# Patient Record
Sex: FEMALE | Race: WHITE | NOT HISPANIC OR LATINO | URBAN - METROPOLITAN AREA
[De-identification: names, ages, dates, MRNs, and addresses within clinical notes are randomized per-mention and may not be internally consistent; named-entity substitution may affect disease eponyms.]

---

## 2017-01-06 ENCOUNTER — EMERGENCY (EMERGENCY)
Facility: HOSPITAL | Age: 77
LOS: 1 days | Discharge: HOME | End: 2017-01-06
Attending: EMERGENCY MEDICINE
Payer: MEDICARE

## 2017-01-06 VITALS
RESPIRATION RATE: 20 BRPM | HEART RATE: 66 BPM | WEIGHT: 279.99 LBS | OXYGEN SATURATION: 100 % | HEIGHT: 69 IN | TEMPERATURE: 98 F | SYSTOLIC BLOOD PRESSURE: 159 MMHG

## 2017-01-06 DIAGNOSIS — Z90.89 ACQUIRED ABSENCE OF OTHER ORGANS: ICD-10-CM

## 2017-01-06 DIAGNOSIS — Z98.89 OTHER SPECIFIED POSTPROCEDURAL STATES: Chronic | ICD-10-CM

## 2017-01-06 DIAGNOSIS — R06.02 SHORTNESS OF BREATH: ICD-10-CM

## 2017-01-06 DIAGNOSIS — Z90.710 ACQUIRED ABSENCE OF BOTH CERVIX AND UTERUS: Chronic | ICD-10-CM

## 2017-01-06 DIAGNOSIS — Z98.890 OTHER SPECIFIED POSTPROCEDURAL STATES: ICD-10-CM

## 2017-01-06 DIAGNOSIS — I10 ESSENTIAL (PRIMARY) HYPERTENSION: ICD-10-CM

## 2017-01-06 DIAGNOSIS — Z88.1 ALLERGY STATUS TO OTHER ANTIBIOTIC AGENTS STATUS: ICD-10-CM

## 2017-01-06 DIAGNOSIS — R53.1 WEAKNESS: ICD-10-CM

## 2017-01-06 DIAGNOSIS — Z88.0 ALLERGY STATUS TO PENICILLIN: ICD-10-CM

## 2017-01-06 DIAGNOSIS — Z88.2 ALLERGY STATUS TO SULFONAMIDES: ICD-10-CM

## 2017-01-06 DIAGNOSIS — Z98.1 ARTHRODESIS STATUS: Chronic | ICD-10-CM

## 2017-01-06 DIAGNOSIS — E11.9 TYPE 2 DIABETES MELLITUS WITHOUT COMPLICATIONS: ICD-10-CM

## 2017-01-06 DIAGNOSIS — Z90.710 ACQUIRED ABSENCE OF BOTH CERVIX AND UTERUS: ICD-10-CM

## 2017-01-06 DIAGNOSIS — K21.9 GASTRO-ESOPHAGEAL REFLUX DISEASE WITHOUT ESOPHAGITIS: ICD-10-CM

## 2017-01-06 DIAGNOSIS — Z90.49 ACQUIRED ABSENCE OF OTHER SPECIFIED PARTS OF DIGESTIVE TRACT: ICD-10-CM

## 2017-01-06 LAB
ANION GAP SERPL CALC-SCNC: 16 MMOL/L — SIGNIFICANT CHANGE UP (ref 5–17)
ANISOCYTOSIS BLD QL: SLIGHT — SIGNIFICANT CHANGE UP
APTT BLD: 38.6 SEC — HIGH (ref 27.5–37.4)
BASOPHILS # BLD AUTO: 0 K/UL — SIGNIFICANT CHANGE UP (ref 0–0.2)
BASOPHILS NFR BLD AUTO: 1 % — SIGNIFICANT CHANGE UP (ref 0–2)
BUN SERPL-MCNC: 18 MG/DL — SIGNIFICANT CHANGE UP (ref 8–20)
CALCIUM SERPL-MCNC: 8.8 MG/DL — SIGNIFICANT CHANGE UP (ref 8.6–10.2)
CHLORIDE SERPL-SCNC: 100 MMOL/L — SIGNIFICANT CHANGE UP (ref 98–107)
CO2 SERPL-SCNC: 24 MMOL/L — SIGNIFICANT CHANGE UP (ref 22–29)
CREAT SERPL-MCNC: 1.15 MG/DL — SIGNIFICANT CHANGE UP (ref 0.5–1.3)
DACRYOCYTES BLD QL SMEAR: SLIGHT — SIGNIFICANT CHANGE UP
ELLIPTOCYTES BLD QL SMEAR: SLIGHT — SIGNIFICANT CHANGE UP
EOSINOPHIL # BLD AUTO: 0.1 K/UL — SIGNIFICANT CHANGE UP (ref 0–0.5)
EOSINOPHIL NFR BLD AUTO: 3 % — SIGNIFICANT CHANGE UP (ref 0–5)
GLUCOSE SERPL-MCNC: 110 MG/DL — SIGNIFICANT CHANGE UP (ref 70–115)
HCT VFR BLD CALC: 35 % — LOW (ref 37–47)
HGB BLD-MCNC: 11.2 G/DL — LOW (ref 12–16)
INR BLD: 1.73 RATIO — HIGH (ref 0.88–1.16)
LYMPHOCYTES # BLD AUTO: 1 K/UL — SIGNIFICANT CHANGE UP (ref 1–4.8)
LYMPHOCYTES # BLD AUTO: 24 % — SIGNIFICANT CHANGE UP (ref 20–55)
MACROCYTES BLD QL: SLIGHT — SIGNIFICANT CHANGE UP
MAGNESIUM SERPL-MCNC: 2.2 MG/DL — SIGNIFICANT CHANGE UP (ref 1.8–2.5)
MCHC RBC-ENTMCNC: 26.7 PG — LOW (ref 27–31)
MCHC RBC-ENTMCNC: 32 G/DL — SIGNIFICANT CHANGE UP (ref 32–36)
MCV RBC AUTO: 83.3 FL — SIGNIFICANT CHANGE UP (ref 81–99)
MICROCYTES BLD QL: SLIGHT — SIGNIFICANT CHANGE UP
MONOCYTES # BLD AUTO: 0.4 K/UL — SIGNIFICANT CHANGE UP (ref 0–0.8)
MONOCYTES NFR BLD AUTO: 10 % — SIGNIFICANT CHANGE UP (ref 3–10)
NEUTROPHILS # BLD AUTO: 2.7 K/UL — SIGNIFICANT CHANGE UP (ref 1.8–8)
NEUTROPHILS NFR BLD AUTO: 58 % — SIGNIFICANT CHANGE UP (ref 37–73)
NEUTS BAND # BLD: 1 % — SIGNIFICANT CHANGE UP (ref 0–8)
OVALOCYTES BLD QL SMEAR: SLIGHT — SIGNIFICANT CHANGE UP
PHOSPHATE SERPL-MCNC: 2.9 MG/DL — SIGNIFICANT CHANGE UP (ref 2.4–4.7)
PLAT MORPH BLD: NORMAL — SIGNIFICANT CHANGE UP
PLATELET # BLD AUTO: 230 K/UL — SIGNIFICANT CHANGE UP (ref 150–400)
POIKILOCYTOSIS BLD QL AUTO: SLIGHT — SIGNIFICANT CHANGE UP
POTASSIUM SERPL-MCNC: 3.8 MMOL/L — SIGNIFICANT CHANGE UP (ref 3.5–5.3)
POTASSIUM SERPL-SCNC: 3.8 MMOL/L — SIGNIFICANT CHANGE UP (ref 3.5–5.3)
PROTHROM AB SERPL-ACNC: 19.1 SEC — HIGH (ref 10–13.1)
RBC # BLD: 4.2 M/UL — LOW (ref 4.4–5.2)
RBC BLD AUTO: ABNORMAL
SCHISTOCYTES BLD QL AUTO: SLIGHT — SIGNIFICANT CHANGE UP
SODIUM SERPL-SCNC: 140 MMOL/L — SIGNIFICANT CHANGE UP (ref 135–145)
TROPONIN T SERPL-MCNC: <0.01 NG/ML — SIGNIFICANT CHANGE UP (ref 0–0.06)
VARIANT LYMPHS # BLD: 3 % — SIGNIFICANT CHANGE UP (ref 0–6)
WBC # BLD: 4.27 K/UL — LOW (ref 4.8–10.8)
WBC # FLD AUTO: 4.27 K/UL — LOW (ref 4.8–10.8)

## 2017-01-06 PROCEDURE — 80048 BASIC METABOLIC PNL TOTAL CA: CPT

## 2017-01-06 PROCEDURE — 99283 EMERGENCY DEPT VISIT LOW MDM: CPT | Mod: 25

## 2017-01-06 PROCEDURE — 99285 EMERGENCY DEPT VISIT HI MDM: CPT

## 2017-01-06 PROCEDURE — 71045 X-RAY EXAM CHEST 1 VIEW: CPT

## 2017-01-06 PROCEDURE — 85610 PROTHROMBIN TIME: CPT

## 2017-01-06 PROCEDURE — 84100 ASSAY OF PHOSPHORUS: CPT

## 2017-01-06 PROCEDURE — 93005 ELECTROCARDIOGRAM TRACING: CPT

## 2017-01-06 PROCEDURE — 83735 ASSAY OF MAGNESIUM: CPT

## 2017-01-06 PROCEDURE — 85027 COMPLETE CBC AUTOMATED: CPT

## 2017-01-06 PROCEDURE — 85730 THROMBOPLASTIN TIME PARTIAL: CPT

## 2017-01-06 PROCEDURE — 93010 ELECTROCARDIOGRAM REPORT: CPT

## 2017-01-06 PROCEDURE — 71010: CPT | Mod: 26

## 2017-01-06 PROCEDURE — 84484 ASSAY OF TROPONIN QUANT: CPT

## 2017-01-06 NOTE — ED ADULT NURSE NOTE - PSH
S/P ankle arthrodesis    S/P appendectomy    S/P hemorrhoidectomy    S/P hysterectomy    S/P tonsillectomy

## 2017-01-06 NOTE — ED ADULT NURSE NOTE - PMH
Angina pectoris    Arthritis    Atrial fibrillation    Diabetes    DVT (deep venous thrombosis)    Fibromyalgia    GERD (gastroesophageal reflux disease)    HTN (hypertension)    Sleep apnea

## 2017-01-06 NOTE — ED ADULT NURSE NOTE - OBJECTIVE STATEMENT
75 y/o female c/o of an episode of dizziness and flush feeling today when she stood up. Pt AOx3, 97% O2Sat on room air, denies chest pain or SOB at this time. HR41. will continue to monitor.

## 2017-01-06 NOTE — ED ADULT NURSE REASSESSMENT NOTE - NS ED NURSE REASSESS COMMENT FT1
Pt family concerned with Pt neuro due to episode of unsteady gait at home. Pt AOx3, equal strength bilateral, speech normal, will continue to monitor.

## 2017-01-08 NOTE — ED PROVIDER NOTE - MEDICAL DECISION MAKING DETAILS
pt feeling much better in nad return to ed for intractrable HA persitent vomiting or new onset motor or senory defuicits  DDX considered: stable angina, aortic dissection, pericarditis, pneumonia, pe, pts, chest wall pain, esophageal spasm and abdominal emergencies. I have discussed with patient with negative troponin and normal ekg their 28 day cardiac risk and they have agreed to outpt cardio f/ut his week without fail. asa per day until then. return to the ed immediately for any intractable chest pain or sob. pt agrees to plan of care

## 2017-01-08 NOTE — ED PROVIDER NOTE - OBJECTIVE STATEMENT
pt presents with genralized weakness. no new medications. denies fever. denies HA or neck pain. no chest pain or sob. no abd pain. no n/v/d. no urinary f/u/d. no back pain. no motor or sensory deficits. denies drug use. no recent travel. no rash. no other acute issues symptoms or concerns

## 2017-01-08 NOTE — ED PROVIDER NOTE - PHYSICAL EXAMINATION
neuro: CN II - XII intact, EOMI, PERRL, no papilledema, 5/5 muscle strength x 4 extremities, no sensory deficits, 2+ dtr globally, negative babinski, no ataxic gait, normal DWIGHT and FNT, normal romberg

## 2017-02-07 ENCOUNTER — INPATIENT (INPATIENT)
Facility: HOSPITAL | Age: 77
LOS: 3 days | Discharge: NURSING, REHAB OR LONG-TERM CARE | DRG: 312 | End: 2017-02-11
Attending: FAMILY MEDICINE | Admitting: HOSPITALIST
Payer: MEDICARE

## 2017-02-07 VITALS
WEIGHT: 261.03 LBS | DIASTOLIC BLOOD PRESSURE: 104 MMHG | SYSTOLIC BLOOD PRESSURE: 191 MMHG | OXYGEN SATURATION: 100 % | RESPIRATION RATE: 22 BRPM | HEIGHT: 68 IN | TEMPERATURE: 98 F | HEART RATE: 84 BPM

## 2017-02-07 DIAGNOSIS — R42 DIZZINESS AND GIDDINESS: ICD-10-CM

## 2017-02-07 DIAGNOSIS — D50.0 IRON DEFICIENCY ANEMIA SECONDARY TO BLOOD LOSS (CHRONIC): ICD-10-CM

## 2017-02-07 DIAGNOSIS — Z98.89 OTHER SPECIFIED POSTPROCEDURAL STATES: Chronic | ICD-10-CM

## 2017-02-07 DIAGNOSIS — Z90.710 ACQUIRED ABSENCE OF BOTH CERVIX AND UTERUS: Chronic | ICD-10-CM

## 2017-02-07 DIAGNOSIS — I82.409 ACUTE EMBOLISM AND THROMBOSIS OF UNSPECIFIED DEEP VEINS OF UNSPECIFIED LOWER EXTREMITY: ICD-10-CM

## 2017-02-07 DIAGNOSIS — I48.2 CHRONIC ATRIAL FIBRILLATION: ICD-10-CM

## 2017-02-07 DIAGNOSIS — Z98.1 ARTHRODESIS STATUS: Chronic | ICD-10-CM

## 2017-02-07 DIAGNOSIS — I10 ESSENTIAL (PRIMARY) HYPERTENSION: ICD-10-CM

## 2017-02-07 DIAGNOSIS — R26.81 UNSTEADINESS ON FEET: ICD-10-CM

## 2017-02-07 LAB
ALBUMIN SERPL ELPH-MCNC: 4.1 G/DL — SIGNIFICANT CHANGE UP (ref 3.3–5.2)
ALP SERPL-CCNC: 128 U/L — HIGH (ref 40–120)
ALT FLD-CCNC: 11 U/L — SIGNIFICANT CHANGE UP
ANION GAP SERPL CALC-SCNC: 14 MMOL/L — SIGNIFICANT CHANGE UP (ref 5–17)
ANISOCYTOSIS BLD QL: SLIGHT — SIGNIFICANT CHANGE UP
AST SERPL-CCNC: 16 U/L — SIGNIFICANT CHANGE UP
BASOPHILS # BLD AUTO: 0 K/UL — SIGNIFICANT CHANGE UP (ref 0–0.2)
BILIRUB SERPL-MCNC: 0.3 MG/DL — LOW (ref 0.4–2)
BUN SERPL-MCNC: 13 MG/DL — SIGNIFICANT CHANGE UP (ref 8–20)
CALCIUM SERPL-MCNC: 9.4 MG/DL — SIGNIFICANT CHANGE UP (ref 8.6–10.2)
CHLORIDE SERPL-SCNC: 104 MMOL/L — SIGNIFICANT CHANGE UP (ref 98–107)
CK SERPL-CCNC: 53 U/L — SIGNIFICANT CHANGE UP (ref 25–170)
CO2 SERPL-SCNC: 25 MMOL/L — SIGNIFICANT CHANGE UP (ref 22–29)
CREAT SERPL-MCNC: 0.97 MG/DL — SIGNIFICANT CHANGE UP (ref 0.5–1.3)
D DIMER BLD IA.RAPID-MCNC: 155 NG/ML DDU — SIGNIFICANT CHANGE UP
EOSINOPHIL # BLD AUTO: 0.1 K/UL — SIGNIFICANT CHANGE UP (ref 0–0.5)
EOSINOPHIL NFR BLD AUTO: 2 % — SIGNIFICANT CHANGE UP (ref 0–5)
GLUCOSE SERPL-MCNC: 118 MG/DL — HIGH (ref 70–115)
HCT VFR BLD CALC: 38.2 % — SIGNIFICANT CHANGE UP (ref 37–47)
HGB BLD-MCNC: 12 G/DL — SIGNIFICANT CHANGE UP (ref 12–16)
INR BLD: 1.29 RATIO — HIGH (ref 0.88–1.16)
LYMPHOCYTES # BLD AUTO: 1.2 K/UL — SIGNIFICANT CHANGE UP (ref 1–4.8)
LYMPHOCYTES # BLD AUTO: 28 % — SIGNIFICANT CHANGE UP (ref 20–55)
MCHC RBC-ENTMCNC: 27.3 PG — SIGNIFICANT CHANGE UP (ref 27–31)
MCHC RBC-ENTMCNC: 31.4 G/DL — LOW (ref 32–36)
MCV RBC AUTO: 86.8 FL — SIGNIFICANT CHANGE UP (ref 81–99)
MONOCYTES # BLD AUTO: 0.4 K/UL — SIGNIFICANT CHANGE UP (ref 0–0.8)
MONOCYTES NFR BLD AUTO: 11 % — HIGH (ref 3–10)
NEUTROPHILS # BLD AUTO: 2 K/UL — SIGNIFICANT CHANGE UP (ref 1.8–8)
NEUTROPHILS NFR BLD AUTO: 57 % — SIGNIFICANT CHANGE UP (ref 37–73)
NEUTS BAND # BLD: 2 % — SIGNIFICANT CHANGE UP (ref 0–8)
OVALOCYTES BLD QL SMEAR: SLIGHT — SIGNIFICANT CHANGE UP
PLAT MORPH BLD: NORMAL — SIGNIFICANT CHANGE UP
PLATELET # BLD AUTO: 201 K/UL — SIGNIFICANT CHANGE UP (ref 150–400)
POIKILOCYTOSIS BLD QL AUTO: SLIGHT — SIGNIFICANT CHANGE UP
POTASSIUM SERPL-MCNC: 3.6 MMOL/L — SIGNIFICANT CHANGE UP (ref 3.5–5.3)
POTASSIUM SERPL-SCNC: 3.6 MMOL/L — SIGNIFICANT CHANGE UP (ref 3.5–5.3)
PROT SERPL-MCNC: 7.9 G/DL — SIGNIFICANT CHANGE UP (ref 6.6–8.7)
PROTHROM AB SERPL-ACNC: 14.2 SEC — HIGH (ref 10–13.1)
RBC # BLD: 4.4 M/UL — SIGNIFICANT CHANGE UP (ref 4.4–5.2)
RBC # FLD: 22.3 % — HIGH (ref 11–15.6)
RBC BLD AUTO: ABNORMAL
SODIUM SERPL-SCNC: 143 MMOL/L — SIGNIFICANT CHANGE UP (ref 135–145)
TROPONIN T SERPL-MCNC: <0.01 NG/ML — SIGNIFICANT CHANGE UP (ref 0–0.06)
WBC # BLD: 3.6 K/UL — LOW (ref 4.8–10.8)
WBC # FLD AUTO: 3.6 K/UL — LOW (ref 4.8–10.8)

## 2017-02-07 PROCEDURE — 99285 EMERGENCY DEPT VISIT HI MDM: CPT

## 2017-02-07 PROCEDURE — 93010 ELECTROCARDIOGRAM REPORT: CPT

## 2017-02-07 PROCEDURE — 93970 EXTREMITY STUDY: CPT | Mod: 26

## 2017-02-07 PROCEDURE — 99222 1ST HOSP IP/OBS MODERATE 55: CPT

## 2017-02-07 PROCEDURE — 70450 CT HEAD/BRAIN W/O DYE: CPT | Mod: 26

## 2017-02-07 RX ORDER — HYDRALAZINE HCL 50 MG
5 TABLET ORAL ONCE
Qty: 0 | Refills: 0 | Status: COMPLETED | OUTPATIENT
Start: 2017-02-07 | End: 2017-02-07

## 2017-02-07 RX ORDER — ENOXAPARIN SODIUM 100 MG/ML
120 INJECTION SUBCUTANEOUS ONCE
Qty: 0 | Refills: 0 | Status: COMPLETED | OUTPATIENT
Start: 2017-02-07 | End: 2017-02-07

## 2017-02-07 RX ORDER — ACETAMINOPHEN 500 MG
650 TABLET ORAL ONCE
Qty: 0 | Refills: 0 | Status: COMPLETED | OUTPATIENT
Start: 2017-02-07 | End: 2017-02-07

## 2017-02-07 RX ORDER — INSULIN LISPRO 100/ML
VIAL (ML) SUBCUTANEOUS
Qty: 0 | Refills: 0 | Status: DISCONTINUED | OUTPATIENT
Start: 2017-02-07 | End: 2017-02-11

## 2017-02-07 RX ORDER — ATORVASTATIN CALCIUM 80 MG/1
40 TABLET, FILM COATED ORAL AT BEDTIME
Qty: 0 | Refills: 0 | Status: DISCONTINUED | OUTPATIENT
Start: 2017-02-07 | End: 2017-02-11

## 2017-02-07 RX ORDER — SODIUM CHLORIDE 9 MG/ML
3 INJECTION INTRAMUSCULAR; INTRAVENOUS; SUBCUTANEOUS ONCE
Qty: 0 | Refills: 0 | Status: COMPLETED | OUTPATIENT
Start: 2017-02-07 | End: 2017-02-07

## 2017-02-07 RX ORDER — PANTOPRAZOLE SODIUM 20 MG/1
40 TABLET, DELAYED RELEASE ORAL
Qty: 0 | Refills: 0 | Status: DISCONTINUED | OUTPATIENT
Start: 2017-02-07 | End: 2017-02-11

## 2017-02-07 RX ORDER — DILTIAZEM HCL 120 MG
180 CAPSULE, EXT RELEASE 24 HR ORAL
Qty: 0 | Refills: 0 | Status: DISCONTINUED | OUTPATIENT
Start: 2017-02-07 | End: 2017-02-08

## 2017-02-07 RX ORDER — WARFARIN SODIUM 2.5 MG/1
7.5 TABLET ORAL ONCE
Qty: 0 | Refills: 0 | Status: COMPLETED | OUTPATIENT
Start: 2017-02-07 | End: 2017-02-07

## 2017-02-07 RX ORDER — DEXTROSE 50 % IN WATER 50 %
25 SYRINGE (ML) INTRAVENOUS ONCE
Qty: 0 | Refills: 0 | Status: DISCONTINUED | OUTPATIENT
Start: 2017-02-07 | End: 2017-02-11

## 2017-02-07 RX ORDER — FUROSEMIDE 40 MG
40 TABLET ORAL DAILY
Qty: 0 | Refills: 0 | Status: DISCONTINUED | OUTPATIENT
Start: 2017-02-07 | End: 2017-02-11

## 2017-02-07 RX ORDER — DEXTROSE 50 % IN WATER 50 %
1 SYRINGE (ML) INTRAVENOUS ONCE
Qty: 0 | Refills: 0 | Status: DISCONTINUED | OUTPATIENT
Start: 2017-02-07 | End: 2017-02-11

## 2017-02-07 RX ORDER — GLUCAGON INJECTION, SOLUTION 0.5 MG/.1ML
1 INJECTION, SOLUTION SUBCUTANEOUS ONCE
Qty: 0 | Refills: 0 | Status: DISCONTINUED | OUTPATIENT
Start: 2017-02-07 | End: 2017-02-11

## 2017-02-07 RX ORDER — AMIODARONE HYDROCHLORIDE 400 MG/1
100 TABLET ORAL DAILY
Qty: 0 | Refills: 0 | Status: DISCONTINUED | OUTPATIENT
Start: 2017-02-07 | End: 2017-02-11

## 2017-02-07 RX ORDER — SODIUM CHLORIDE 9 MG/ML
1000 INJECTION, SOLUTION INTRAVENOUS
Qty: 0 | Refills: 0 | Status: DISCONTINUED | OUTPATIENT
Start: 2017-02-07 | End: 2017-02-11

## 2017-02-07 RX ORDER — TRAMADOL HYDROCHLORIDE 50 MG/1
50 TABLET ORAL
Qty: 0 | Refills: 0 | Status: DISCONTINUED | OUTPATIENT
Start: 2017-02-07 | End: 2017-02-11

## 2017-02-07 RX ORDER — MECLIZINE HCL 12.5 MG
25 TABLET ORAL ONCE
Qty: 0 | Refills: 0 | Status: COMPLETED | OUTPATIENT
Start: 2017-02-07 | End: 2017-02-07

## 2017-02-07 RX ORDER — DEXTROSE 50 % IN WATER 50 %
12.5 SYRINGE (ML) INTRAVENOUS ONCE
Qty: 0 | Refills: 0 | Status: DISCONTINUED | OUTPATIENT
Start: 2017-02-07 | End: 2017-02-11

## 2017-02-07 RX ADMIN — ENOXAPARIN SODIUM 120 MILLIGRAM(S): 100 INJECTION SUBCUTANEOUS at 14:58

## 2017-02-07 RX ADMIN — WARFARIN SODIUM 7.5 MILLIGRAM(S): 2.5 TABLET ORAL at 23:00

## 2017-02-07 RX ADMIN — SODIUM CHLORIDE 3 MILLILITER(S): 9 INJECTION INTRAMUSCULAR; INTRAVENOUS; SUBCUTANEOUS at 08:53

## 2017-02-07 RX ADMIN — Medication 5 MILLIGRAM(S): at 10:24

## 2017-02-07 RX ADMIN — Medication 25 MILLIGRAM(S): at 17:17

## 2017-02-07 RX ADMIN — Medication 650 MILLIGRAM(S): at 11:54

## 2017-02-07 RX ADMIN — Medication 180 MILLIGRAM(S): at 23:04

## 2017-02-07 RX ADMIN — ATORVASTATIN CALCIUM 40 MILLIGRAM(S): 80 TABLET, FILM COATED ORAL at 23:01

## 2017-02-07 RX ADMIN — Medication 650 MILLIGRAM(S): at 13:01

## 2017-02-07 NOTE — ED ADULT NURSE REASSESSMENT NOTE - NS ED NURSE REASSESS COMMENT FT1
bp in both arms. 214/118/ MD. Jesus made aware and re took at bedside. no new orders.
attempted to get pt. to ambulate. stated she felt dizzy and slight palpitations. MD MOSLEY made aware. meds given per chart. will continue to monitor. pending dispo.
pt denies cp, sob, nv,d, head ct done awaiting results, , states is comfortable while resting on bed, will continue to monitor
pt. a&ox3. in no apparent distress. denies pain cp, sob. will continue to monitor.
pt. a&ox3. resting comfortably with daughter at bedside. denies cp/ c/o of 8/10 headache. meds given per chart. will continue to monitor. pending dispo.
pt. going for leg dopplers with transport

## 2017-02-07 NOTE — ED PROVIDER NOTE - PROGRESS NOTE DETAILS
Pt seen by Cardio/Dr. Nathan and paln was to icnrease Cardizem for HTN and gice 1 stat dose of Lovenox and increase Coumadin.  Pt still with c/o dizziness sand unable to ambulate independently despite having neg CT head and after receiving po Meclizine for probable vertigo.  Case d/w Hospitalist/Dr. Em for PMD/Dr. Mills and will admi

## 2017-02-07 NOTE — H&P ADULT. - ASSESSMENT
77yo With PMH of Afib s/p DCCV, on coumadin, HTN, DM , h/o anemia, 2/2 GAVE on iron infusions presents from home after she had epsiodes of light headedness and felt very unstabl susana her feet, SHe felt like she would fall. She has had these symptoms for a month now but has gitten progressively worse. In the ER, was hypertensive, CT head - neg for acute events. Labs - normal. She was seen by cardiology . rec D-dimer- and DVT - scan - were neg. She is being admitted for uncontrolled HTN with gait instability possibly Vertigo.

## 2017-02-07 NOTE — CONSULT NOTE ADULT - SUBJECTIVE AND OBJECTIVE BOX
[facility], [facility]                                                    [address], [address], [place] [address]                                                         PHONE: [phone]    FAX: [phone] [address], [address], [place] [address]                                                 PHONE: [phone]    FAX: [phone]  *******************************************************************************    Reason for Consult:    HPI:  SARY SMALLS is a 76y Female    PAST MEDICAL & SURGICAL HISTORY:  Sleep apnea  DVT (deep venous thrombosis)  Arthritis  Angina pectoris  Atrial fibrillation  Fibromyalgia  Diabetes  GERD (gastroesophageal reflux disease)  HTN (hypertension)  S/P ankle arthrodesis  S/P tonsillectomy  S/P appendectomy  S/P hemorrhoidectomy  S/P hysterectomy      clonidine (Unknown)  Darvocet-N 100 (Unknown)  enalapril (Unknown)  hydrochlorothiazide (Unknown)  Keflex (Unknown)  Lyrica (Unknown)  metoprolol (Unknown)  penicillin (Unknown)  sulfa drugs (Unknown)  tetracycline (Unknown)      MEDICATIONS  (STANDING):    MEDICATIONS  (PRN):      Social History: no active tobacco / EtOH / IVDA    Family History: Family history of heart disease (Father)  Family history of cancer in mother (Mother)      ROS: As noted above, otherwise unremarkable.    Vital Signs Last 24 Hrs  T(C): 36.6, Max: 36.8 (02-07 @ 10:18)  T(F): 97.8, Max: 98.2 (02-07 @ 10:18)  HR: 68 (68 - 102)  BP: 154/70 (154/70 - 216/86)  BP(mean): --  RR: 20 (18 - 22)  SpO2: 983% (96% - 983%)    I&O's Detail    I&O's Summary          PHYSICAL EXAM:  General: Appears well developed, well nourished, no acute distress overweight  HEENT: Head: normocephalic, atraumatic  Eyes: Pupils equal and reactive  Neck: Supple, no carotid bruit, no JVD, no HJR  CARDIOVASCULAR: Normal S1 and S2, no murmur, rub, or gallop  LUNGS: Clear to auscultation bilaterally, no rales, rhonchi or wheeze  ABDOMEN: Soft, nontender, non-distended, positive bowel sounds, no mass or bruit  EXTREMITIES: chronic lymphedema  SKIN: Warm and dry with normal turgor  NEURO: Alert & oriented x 3, grossly intact  PSYCH: normal mood and affect    LABS:                        12.0   3.60  )-----------( 201      ( 07 Feb 2017 09:22 )             38.2     07 Feb 2017 09:22    143    |  104    |  13.0   ----------------------------<  118    3.6     |  25.0   |  0.97     Ca    9.4        07 Feb 2017 09:22    TPro  7.9    /  Alb  4.1    /  TBili  0.3    /  DBili  x      /  AST  16     /  ALT  11     /  AlkPhos  128    07 Feb 2017 09:22    CARDIAC MARKERS ( 07 Feb 2017 09:22 )  x     / <0.01 ng/mL / 53 U/L / x     / x          PT/INR - ( 07 Feb 2017 09:21 )   PT: 14.2 sec;   INR: 1.29 ratio             RADIOLOGY & ADDITIONAL STUDIES:    ECG: SR LAD PVC's    Head CT: 2/7/17 IMPRESSION:  Mild chronic microvascular changes without evidence of an   acute transcortical infarction or hemorrhage. MR is a more sensitive   imaging modality for the evaluation of an acute infarction. 1.1 cm right   parotid gland nodule, larger in size as compared to the prior cervical   spine CT. Outpatient parotid gland MRI is recommended.     CXR: 2/7/17 IMPRESSION:    Support Devices: None  Heart: Cardiomegaly stable  Mediastinum:  Unremarkable  Lungs/Airways:  Unremarkable  Bones/Soft tissues: Unremarkable        Assessment and Plan:  In summary, SARY SMALLS is a 76y Female with past medical history significant for HTN, normal coronary anatomy on cath 3/'10, hx of A flutter with recent DCCV to SR, obesity, HL, TIM on coumadin with cc of lightheadedness. No CP or SOB. Hx of recent GIB. Remote hx of DVT 30yrs ago.   Carotid 12/21/16 16-49% on Right and 1-15% on the left.  Echo 12/21/16 EF= 55-60%, LVH biatrial enlargement. mild MR/TR.     Imp: Lightheadedness.  No sx to suggest ACS. Hx of A flutter with recent DCCV. Subtherapeutic INR (pt had AC held for GI shields). Head CT no acute finding    HTN: Increase cardizem to 240mg BID. Was on 180mg BID    Recent DCCV: Subtherapeutic INR.  Will give lovenox x 1 dose now and increase coumadin to 7.5mg x 2 days, then resume prior dose of 5mg daily. Pt with recent GI shields due to GIB.  Will need outpt f/u of INR. Pt has home lab monitoring    Lightheaded:  Ambulate pt. Check D dimer and LE doppler. Likely sx are vertigo.    No sx of ACS. Hx of normal coronaries.Normal LV function.  Outpt ischemic eval. CE are negative x 1

## 2017-02-07 NOTE — ED PROVIDER NOTE - CARE PLAN
Principal Discharge DX:	Dizziness  Secondary Diagnosis:	Hypertension  Secondary Diagnosis:	Gait instability

## 2017-02-07 NOTE — ED PROVIDER NOTE - OBJECTIVE STATEMENT
75 yo F presents to ED with daughter c/o increased generalized weakness and dizziness x last 1 month which got worse last evening.  Pt was unable to ambulate without assistance.  Pt with hx of A-fib and was restarted on Coumadin after having repeat endoscopy after being hospitalized 1 month ago for anemia and receiving 3 transfusions.  Pt denies any assoc CP, but does admit to some exertional dyspnea.  Pt followed by Cardio/Dr. Villegas and PMD/Dr. Mills

## 2017-02-07 NOTE — ED ADULT NURSE NOTE - FAMILY HISTORY
Mother  Still living? Unknown  Family history of cancer in mother, Age at diagnosis: Age Unknown     Father  Still living? Unknown  Family history of heart disease, Age at diagnosis: Age Unknown

## 2017-02-07 NOTE — ED ADULT NURSE NOTE - OBJECTIVE STATEMENT
77 y/o a&ox3 F BIBA c/o of increasing weakness starting at 10:30 last night with palpitations on exertion. as per pt. having headaches on and off with little diarrhea. pt. denies chest pain, vomiting, fever. 75 y/o a&ox3 F BIBA c/o of increasing weakness starting at 10:30 last night with palpitations on exertion. as per pt. having headaches on and off with little diarrhea. pt. denies chest pain, vomiting, fever. pt. resumed coumadin on Thursday, reports stopped for 3 days prior for endoscopy done on Friday. IV placed, labs sent. will continue to monitor. pending dispo. 77 y/o a&ox3 F BIBA c/o of dizziness x1 month, increasing at 10:30 last night with palpitations on exertion. as per pt. having headaches on and off with little diarrhea. pt. denies chest pain, vomiting, fever. pt. resumed coumadin on Thursday, reports stopped for 3 days prior for endoscopy done on Friday. IV placed, labs sent. will continue to monitor. pending dispo. 75 y/o a&ox3 F BIBA c/o of dizziness x1 month, increasing at 10:30 last night with palpitations on exertion. as per pt. having headaches on and off with little diarrhea. pt. denies chest pain, vomiting, fever. pt. resumed coumadin on Thursday, reports stopped for 3 days prior for endoscopy done on Friday. as per pt. took BP meds this morning. IV placed, labs sent. will continue to monitor. pending dispo.

## 2017-02-07 NOTE — H&P ADULT. - PROBLEM SELECTOR PLAN 1
? vertigo vs other  consider neuro eval / MRI   control BP  appreciate cardio eval  Cardio w/u reviewed

## 2017-02-08 LAB
ANION GAP SERPL CALC-SCNC: 14 MMOL/L — SIGNIFICANT CHANGE UP (ref 5–17)
ANISOCYTOSIS BLD QL: SLIGHT — SIGNIFICANT CHANGE UP
BASOPHILS # BLD AUTO: 0 K/UL — SIGNIFICANT CHANGE UP (ref 0–0.2)
BASOPHILS NFR BLD AUTO: 0.6 % — SIGNIFICANT CHANGE UP (ref 0–2)
BUN SERPL-MCNC: 13 MG/DL — SIGNIFICANT CHANGE UP (ref 8–20)
CALCIUM SERPL-MCNC: 9.2 MG/DL — SIGNIFICANT CHANGE UP (ref 8.6–10.2)
CHLORIDE SERPL-SCNC: 104 MMOL/L — SIGNIFICANT CHANGE UP (ref 98–107)
CO2 SERPL-SCNC: 26 MMOL/L — SIGNIFICANT CHANGE UP (ref 22–29)
CREAT SERPL-MCNC: 0.94 MG/DL — SIGNIFICANT CHANGE UP (ref 0.5–1.3)
DACRYOCYTES BLD QL SMEAR: SLIGHT — SIGNIFICANT CHANGE UP
EOSINOPHIL # BLD AUTO: 0.1 K/UL — SIGNIFICANT CHANGE UP (ref 0–0.5)
EOSINOPHIL NFR BLD AUTO: 3.7 % — SIGNIFICANT CHANGE UP (ref 0–6)
GLUCOSE SERPL-MCNC: 113 MG/DL — SIGNIFICANT CHANGE UP (ref 70–115)
HBA1C BLD-MCNC: 4.9 % — SIGNIFICANT CHANGE UP (ref 4–5.6)
HCT VFR BLD CALC: 37.3 % — SIGNIFICANT CHANGE UP (ref 37–47)
HGB BLD-MCNC: 11.9 G/DL — LOW (ref 12–16)
INR BLD: 1.31 RATIO — HIGH (ref 0.88–1.16)
LYMPHOCYTES # BLD AUTO: 1.3 K/UL — SIGNIFICANT CHANGE UP (ref 1–4.8)
LYMPHOCYTES # BLD AUTO: 36 % — SIGNIFICANT CHANGE UP (ref 20–55)
MACROCYTES BLD QL: SLIGHT — SIGNIFICANT CHANGE UP
MCHC RBC-ENTMCNC: 28 PG — SIGNIFICANT CHANGE UP (ref 27–31)
MCHC RBC-ENTMCNC: 31.9 G/DL — LOW (ref 32–36)
MCV RBC AUTO: 87.8 FL — SIGNIFICANT CHANGE UP (ref 81–99)
MICROCYTES BLD QL: SLIGHT — SIGNIFICANT CHANGE UP
MONOCYTES # BLD AUTO: 0.4 K/UL — SIGNIFICANT CHANGE UP (ref 0–0.8)
MONOCYTES NFR BLD AUTO: 11.2 % — HIGH (ref 3–10)
NEUTROPHILS # BLD AUTO: 1.7 K/UL — LOW (ref 1.8–8)
NEUTROPHILS NFR BLD AUTO: 48.5 % — SIGNIFICANT CHANGE UP (ref 37–73)
OVALOCYTES BLD QL SMEAR: SLIGHT — SIGNIFICANT CHANGE UP
PLAT MORPH BLD: NORMAL — SIGNIFICANT CHANGE UP
PLATELET # BLD AUTO: 202 K/UL — SIGNIFICANT CHANGE UP (ref 150–400)
POIKILOCYTOSIS BLD QL AUTO: SLIGHT — SIGNIFICANT CHANGE UP
POTASSIUM SERPL-MCNC: 3.6 MMOL/L — SIGNIFICANT CHANGE UP (ref 3.5–5.3)
POTASSIUM SERPL-SCNC: 3.6 MMOL/L — SIGNIFICANT CHANGE UP (ref 3.5–5.3)
PROTHROM AB SERPL-ACNC: 14.5 SEC — HIGH (ref 10–13.1)
RBC # BLD: 4.25 M/UL — LOW (ref 4.4–5.2)
RBC # FLD: 23.2 % — HIGH (ref 11–15.6)
RBC BLD AUTO: ABNORMAL
SODIUM SERPL-SCNC: 144 MMOL/L — SIGNIFICANT CHANGE UP (ref 135–145)
T3 SERPL-MCNC: 106 NG/DL — SIGNIFICANT CHANGE UP (ref 80–200)
T4 AB SER-ACNC: 10.32 UG/DL — SIGNIFICANT CHANGE UP (ref 4.5–12)
TSH SERPL-MCNC: 3 UIU/ML — SIGNIFICANT CHANGE UP (ref 0.27–4.2)
WBC # BLD: 3.56 K/UL — LOW (ref 4.8–10.8)
WBC # FLD AUTO: 3.56 K/UL — LOW (ref 4.8–10.8)

## 2017-02-08 RX ORDER — ACETAMINOPHEN 500 MG
650 TABLET ORAL ONCE
Qty: 0 | Refills: 0 | Status: COMPLETED | OUTPATIENT
Start: 2017-02-08 | End: 2017-02-08

## 2017-02-08 RX ORDER — WARFARIN SODIUM 2.5 MG/1
7.5 TABLET ORAL ONCE
Qty: 0 | Refills: 0 | Status: COMPLETED | OUTPATIENT
Start: 2017-02-08 | End: 2017-02-08

## 2017-02-08 RX ORDER — DILTIAZEM HCL 120 MG
240 CAPSULE, EXT RELEASE 24 HR ORAL
Qty: 0 | Refills: 0 | Status: DISCONTINUED | OUTPATIENT
Start: 2017-02-08 | End: 2017-02-11

## 2017-02-08 RX ORDER — HYDRALAZINE HCL 50 MG
10 TABLET ORAL ONCE
Qty: 0 | Refills: 0 | Status: COMPLETED | OUTPATIENT
Start: 2017-02-08 | End: 2017-02-08

## 2017-02-08 RX ADMIN — Medication 180 MILLIGRAM(S): at 06:19

## 2017-02-08 RX ADMIN — WARFARIN SODIUM 7.5 MILLIGRAM(S): 2.5 TABLET ORAL at 22:31

## 2017-02-08 RX ADMIN — Medication 40 MILLIGRAM(S): at 06:19

## 2017-02-08 RX ADMIN — Medication 240 MILLIGRAM(S): at 17:16

## 2017-02-08 RX ADMIN — AMIODARONE HYDROCHLORIDE 100 MILLIGRAM(S): 400 TABLET ORAL at 06:19

## 2017-02-08 RX ADMIN — Medication 650 MILLIGRAM(S): at 04:11

## 2017-02-08 RX ADMIN — PANTOPRAZOLE SODIUM 40 MILLIGRAM(S): 20 TABLET, DELAYED RELEASE ORAL at 06:19

## 2017-02-08 RX ADMIN — Medication 650 MILLIGRAM(S): at 03:41

## 2017-02-08 RX ADMIN — PANTOPRAZOLE SODIUM 40 MILLIGRAM(S): 20 TABLET, DELAYED RELEASE ORAL at 17:16

## 2017-02-08 RX ADMIN — ATORVASTATIN CALCIUM 40 MILLIGRAM(S): 80 TABLET, FILM COATED ORAL at 22:31

## 2017-02-08 RX ADMIN — Medication 10 MILLIGRAM(S): at 03:41

## 2017-02-08 NOTE — CONSULT NOTE ADULT - SUBJECTIVE AND OBJECTIVE BOX
Reason for consultation : anemia      SARY SMALLS is a 76y  Female admitted with Dizziness and lightheadedness  .    HPI :  77y/o female hx afib on coumadin, HTN, DM, iron deficiency anemia p/w c/o lightheadedness. In ER pt found to be hypertensive.  CT brain negative for acute events.  Pt admitted for uncontrolled HTN with gait instability possibly vertigo.  Pt seen by cardiology.  In terms of her anemia pt followed in our office for recurrent iron deficiency anemia.  She had represented in 11/2016 with h/h 6.7/21 and ferritin of 7.  She underwent transfusion and has been receiving IV iron as outpt.  Pt last received feraheme 2/1/17.  H/H at that time was 10.8/33.4.  She has been undergoing outpt GI evaluation and states she had endoscopy last friday.    PAST MEDICAL & SURGICAL HISTORY:  Sleep apnea  DVT (deep venous thrombosis)  Arthritis  Angina pectoris  Atrial fibrillation  Fibromyalgia  Diabetes  GERD (gastroesophageal reflux disease)  HTN (hypertension)  S/P ankle arthrodesis  S/P tonsillectomy  S/P appendectomy  S/P hemorrhoidectomy  S/P hysterectomy      FAMILY HISTORY:  Family history of cancer (Mother)  Family history of heart disease: father 57 y/o  Family history of cancer in mother      Social history :    Allergies:  clonidine (Unknown)  Darvocet-N 100 (Unknown)  enalapril (Unknown)  hydrochlorothiazide (Unknown)  Keflex (Unknown)  Lyrica (Unknown)  metoprolol (Unknown)  penicillin (Unknown)  sulfa drugs (Unknown)  tetracycline (Unknown)      Medications:  insulin lispro (HumaLOG) corrective regimen sliding scale  SubCutaneous three times a day before meals  dextrose 5%. 1000milliLiter(s) IV Continuous <Continuous>  dextrose Gel 1Dose(s) Oral once PRN  dextrose 50% Injectable 12.5Gram(s) IV Push once  dextrose 50% Injectable 25Gram(s) IV Push once  dextrose 50% Injectable 25Gram(s) IV Push once  glucagon  Injectable 1milliGRAM(s) IntraMuscular once PRN  atorvastatin 40milliGRAM(s) Oral at bedtime  furosemide    Tablet 40milliGRAM(s) Oral daily  pantoprazole    Tablet 40milliGRAM(s) Oral two times a day before meals  traMADol 50milliGRAM(s) Oral two times a day  amiodarone    Tablet 100milliGRAM(s) Oral daily  diltiazem   CD 240milliGRAM(s) Oral <User Schedule>      PHYSICAL EXAM:    Constitutional: pt alert and oriented in NAD  Neck:  supple, no adenopathy  Respiratory:  clear  Cardiovascular:  S1S2  Gastrointestinal:  soft, nontender,  +BS  Extremities:  no edema                                11.9   3.56  )-----------( 202      ( 08 Feb 2017 07:09 )             37.3       08 Feb 2017 07:09    144    |  104    |  13.0   ----------------------------<  113    3.6     |  26.0   |  0.94     Ca    9.2        08 Feb 2017 07:09    TPro  7.9    /  Alb  4.1    /  TBili  0.3    /  DBili  x      /  AST  16     /  ALT  11     /  AlkPhos  128    07 Feb 2017 09:22      Images:

## 2017-02-08 NOTE — PROGRESS NOTE ADULT - SUBJECTIVE AND OBJECTIVE BOX
SUBJECTIVE    REVIEW OF SYSTEMS    General: Not in any pain	    Skin/Breast: No rash  	  ENMT: No visual problems, no sore throat	    Respiratory and Thorax: No cough, No CP, No SOB  	  Cardiovascular: No CP, No palpitations    Gastrointestinal: No Abd pain, No N/V/D    Musculoskeletal: No Joint pain, No back pain	    Neurological: No headache    Psychiatric: No anxiety      OBJECTIVE    Vital Signs Last 24 Hrs  T(C): 36.2, Max: 36.8 (02-07 @ 10:18)  T(F): 97.2, Max: 98.2 (02-07 @ 10:18)  HR: 74 (65 - 102)  BP: 160/80 (143/69 - 216/86)  BP(mean): --  RR: 19 (18 - 22)  SpO2: 96% (96% - 99%)    PHYSICAL EXAM:    Constitutional: Not in any distress    Eyes: No conjunctival injection    ENMT: No oral lesions    Neck: No nodes, no adenopathy    Back: Straight, no defects    Respiratory: clear b/l    Cardiovascular: RRR, no murmur    Extremities: No edema, no erythema    Neurological: no focal deficit    Skin: No rash      MEDICATIONS  (STANDING):  insulin lispro (HumaLOG) corrective regimen sliding scale  SubCutaneous three times a day before meals  dextrose 5%. 1000milliLiter(s) IV Continuous <Continuous>  dextrose 50% Injectable 12.5Gram(s) IV Push once  dextrose 50% Injectable 25Gram(s) IV Push once  dextrose 50% Injectable 25Gram(s) IV Push once  atorvastatin 40milliGRAM(s) Oral at bedtime  furosemide    Tablet 40milliGRAM(s) Oral daily  pantoprazole    Tablet 40milliGRAM(s) Oral two times a day before meals  traMADol 37.5milliGRAM(s) Oral two times a day  amiodarone    Tablet 100milliGRAM(s) Oral daily    MEDICATIONS  (PRN):  dextrose Gel 1Dose(s) Oral once PRN Blood Glucose LESS THAN 70 milliGRAM(s)/deciliter  glucagon  Injectable 1milliGRAM(s) IntraMuscular once PRN Glucose LESS THAN 70 milligrams/deciliter    CARDIAC MARKERS ( 07 Feb 2017 09:22 )  x     / <0.01 ng/mL / 53 U/L / x     / x                                11.9   3.56  )-----------( 202      ( 08 Feb 2017 07:09 )             37.3     08 Feb 2017 07:09    144    |  104    |  13.0   ----------------------------<  113    3.6     |  26.0   |  0.94     Ca    9.2        08 Feb 2017 07:09    TPro  7.9    /  Alb  4.1    /  TBili  0.3    /  DBili  x      /  AST  16     /  ALT  11     /  AlkPhos  128    07 Feb 2017 09:22    Allergies    clonidine (Unknown)  Darvocet-N 100 (Unknown)  enalapril (Unknown)  hydrochlorothiazide (Unknown)  Keflex (Unknown)  Lyrica (Unknown)  metoprolol (Unknown)  penicillin (Unknown)  sulfa drugs (Unknown)  tetracycline (Unknown)    Intolerances

## 2017-02-08 NOTE — PHARMACY COMMUNICATION NOTE - COMMENTS
spoke to dr. Meena barahona to change the dose for tramadol 37.5 mg po twice daily to tramadol 50mg po twice daily

## 2017-02-08 NOTE — CONSULT NOTE ADULT - SUBJECTIVE AND OBJECTIVE BOX
[facility]                                 Denise Byers, & Paco                                  [address]                                        [place], [place], [address]                                             [phone]    HISTORY:    The patient is a 76y Female with complaint of light headed type dizziness.  She denies vertigo.  It happens more often upon or shortly after arising, but has happened while laying down in bed. When this happens she gets palpitations and loss of focus. She has diabetes with neuropathy, lymphedema and hypertension.    PAST MEDICAL & SURGICAL HISTORY:  Sleep apnea  DVT (deep venous thrombosis)  Arthritis  Angina pectoris  Atrial fibrillation  Fibromyalgia  Diabetes  GERD (gastroesophageal reflux disease)  HTN (hypertension)  S/P ankle arthrodesis  S/P tonsillectomy  S/P appendectomy  S/P hemorrhoidectomy  S/P hysterectomy      MEDICATIONS  (STANDING):  insulin lispro (HumaLOG) corrective regimen sliding scale  SubCutaneous three times a day before meals  dextrose 5%. 1000milliLiter(s) IV Continuous <Continuous>  dextrose 50% Injectable 12.5Gram(s) IV Push once  dextrose 50% Injectable 25Gram(s) IV Push once  dextrose 50% Injectable 25Gram(s) IV Push once  atorvastatin 40milliGRAM(s) Oral at bedtime  furosemide    Tablet 40milliGRAM(s) Oral daily  pantoprazole    Tablet 40milliGRAM(s) Oral two times a day before meals  traMADol 50milliGRAM(s) Oral two times a day  amiodarone    Tablet 100milliGRAM(s) Oral daily  diltiazem   CD 240milliGRAM(s) Oral <User Schedule>  warfarin 7.5milliGRAM(s) Oral once    MEDICATIONS  (PRN):  dextrose Gel 1Dose(s) Oral once PRN Blood Glucose LESS THAN 70 milliGRAM(s)/deciliter  glucagon  Injectable 1milliGRAM(s) IntraMuscular once PRN Glucose LESS THAN 70 milligrams/deciliter      Allergies    clonidine (Unknown)  Darvocet-N 100 (Unknown)  enalapril (Unknown)  hydrochlorothiazide (Unknown)  Keflex (Unknown)  Lyrica (Unknown)  metoprolol (Unknown)  penicillin (Unknown)  sulfa drugs (Unknown)  tetracycline (Unknown)    Intolerances        SOCIAL HISTORY:  no tob/etoh/drugs    FAMILY HISTORY:  Family history of cancer (Mother)  Family history of heart disease: father 59 y/o  Family history of cancer in mother      ROS:  The patient denies fevers or weight changes.  Denies headache or dizziness.  Denies chest pain.  Denies shortness of breath.  Denies abdominal pain, nausea, or vomiting.  Denies change in urinary pattern.  Denies rash.  Denies recent mood changes.    Exam:  Vital Signs Last 24 Hrs  T(C): 36.7, Max: 36.7 (02-07 @ 19:03)  T(F): 98.1, Max: 98.1 (02-07 @ 19:03)  HR: 69 (65 - 74)  BP: 157/70 (143/69 - 182/108)  BP(mean): --  RR: 18 (18 - 20)  SpO2: 96% (96% - 97%)  General: NAD    Mental status: The patient is awake, alert, and fully oriented. There is no aphasia.    Cranial nerves: . Pupils react Symmetrically to light. There is no visual field deficit to confrontation. Extraocular motion is full with no nystagmus. There is no ptosis. Facial sensation is intact. Facial musculature is symmetric. Palate elevates symmetrically. Tongue is midline.    Motor: There is normal bulk and tone.  Strength is 5/5 in the right arm.   Strength is 5/5 in the left arm.  legs limited due to significant edema    Sensation: diminished in legs due to neuropathy    Reflexes: absent/trace in legs and plantar responses are flexor.    Cerebellar: There is no dysmetria on finger to nose testing.    LABS:                         11.9   3.56  )-----------( 202      ( 08 Feb 2017 07:09 )             37.3       08 Feb 2017 07:09    144    |  104    |  13.0   ----------------------------<  113    3.6     |  26.0   |  0.94     Ca    9.2        08 Feb 2017 07:09    TPro  7.9    /  Alb  4.1    /  TBili  0.3    /  DBili  x      /  AST  16     /  ALT  11     /  AlkPhos  128    07 Feb 2017 09:22      PT/INR - ( 08 Feb 2017 07:09 )   PT: 14.5 sec;   INR: 1.31 ratio          RADIOLOGY & ADDITIONAL STUDIES:  CT head: SVID, no CVA, mass or bleed

## 2017-02-08 NOTE — PROGRESS NOTE ADULT - SUBJECTIVE AND OBJECTIVE BOX
INTERVAL HISTORY: No new symptoms  	    PHYSICAL EXAM:  T(C): 36.2, Max: 36.8 (02-07 @ 10:18)  HR: 74 (65 - 102)  BP: 160/80 (143/69 - 216/86)  RR: 19 (18 - 22)  SpO2: 96% (96% - 99%)  Wt(kg): --  I&O's Summary        Appearance: Normal	  Cardiovascular: Normal S1 S2, No JVD, No murmurs, No edema  Respiratory: Lungs clear to auscultation	  Gastrointestinal:  Soft, Non-tender, + BS	  Skin: No rashes, No ecchymoses, No cyanosis  Neurologic: Non-focal  Extremities: Normal range of motion, No clubbing, cyanosis or edema  Vascular: Peripheral pulses palpable 2+ bilaterally                            11.9   3.56  )-----------( 202      ( 08 Feb 2017 07:09 )             37.3     08 Feb 2017 07:09    144    |  104    |  13.0   ----------------------------<  113    3.6     |  26.0   |  0.94     Ca    9.2        08 Feb 2017 07:09    TPro  7.9    /  Alb  4.1    /  TBili  0.3    /  DBili  x      /  AST  16     /  ALT  11     /  AlkPhos  128    07 Feb 2017 09:22    proBNP:   Lipid Profile:   HgA1c: Hemoglobin A1C, Whole Blood: 4.9 % (02-08 @ 07:09)    TSH: Thyroid Stimulating Hormone, Serum: 3.00 uIU/mL (02-08 @ 07:09)      ASSESSMENT/PLAN: Lightheadedness.  No sx to suggest ACS. Hx of A flutter with recent DCCV. Subtherapeutic INR (pt had AC held for GI shields). Head CT no acute finding  Cardizem increased to 240mg BID yesterday for better BP control.  Currently 150/80  Recent DCCV: Subtherapeutic INR yesterday.  S/P lovenox x 1 dose yesterday, INR management per Medicine.  Will need out-pt f/u of INR. Pt has home lab monitoring  Lightheadedness likely 2/2 to vertigo.  No sx of ACS. Hx of normal coronaries. Normal LV function.  Outpt ischemic eval.

## 2017-02-09 LAB
FERRITIN SERPL-MCNC: 203.3 NG/ML — SIGNIFICANT CHANGE UP (ref 11–306)
HCT VFR BLD CALC: 37 % — SIGNIFICANT CHANGE UP (ref 37–47)
HGB BLD-MCNC: 11.7 G/DL — LOW (ref 12–16)
INR BLD: 1.4 RATIO — HIGH (ref 0.88–1.16)
IRON SATN MFR SERPL: 103 UG/DL — SIGNIFICANT CHANGE UP (ref 37–145)
IRON SATN MFR SERPL: 33 % — SIGNIFICANT CHANGE UP (ref 14–50)
MCHC RBC-ENTMCNC: 27.7 PG — SIGNIFICANT CHANGE UP (ref 27–31)
MCHC RBC-ENTMCNC: 31.6 G/DL — LOW (ref 32–36)
MCV RBC AUTO: 87.7 FL — SIGNIFICANT CHANGE UP (ref 81–99)
PLATELET # BLD AUTO: 204 K/UL — SIGNIFICANT CHANGE UP (ref 150–400)
PROTHROM AB SERPL-ACNC: 15.5 SEC — HIGH (ref 10–13.1)
RBC # BLD: 4.22 M/UL — LOW (ref 4.4–5.2)
RBC # FLD: 22.8 % — HIGH (ref 11–15.6)
TIBC SERPL-MCNC: 315 UG/DL — SIGNIFICANT CHANGE UP (ref 220–430)
TRANSFERRIN SERPL-MCNC: 220 MG/DL — SIGNIFICANT CHANGE UP (ref 192–382)
WBC # BLD: 3.25 K/UL — LOW (ref 4.8–10.8)
WBC # FLD AUTO: 3.25 K/UL — LOW (ref 4.8–10.8)

## 2017-02-09 RX ORDER — WARFARIN SODIUM 2.5 MG/1
7.5 TABLET ORAL ONCE
Qty: 0 | Refills: 0 | Status: COMPLETED | OUTPATIENT
Start: 2017-02-09 | End: 2017-02-09

## 2017-02-09 RX ADMIN — PANTOPRAZOLE SODIUM 40 MILLIGRAM(S): 20 TABLET, DELAYED RELEASE ORAL at 17:38

## 2017-02-09 RX ADMIN — ATORVASTATIN CALCIUM 40 MILLIGRAM(S): 80 TABLET, FILM COATED ORAL at 23:04

## 2017-02-09 RX ADMIN — AMIODARONE HYDROCHLORIDE 100 MILLIGRAM(S): 400 TABLET ORAL at 05:12

## 2017-02-09 RX ADMIN — WARFARIN SODIUM 7.5 MILLIGRAM(S): 2.5 TABLET ORAL at 23:04

## 2017-02-09 RX ADMIN — PANTOPRAZOLE SODIUM 40 MILLIGRAM(S): 20 TABLET, DELAYED RELEASE ORAL at 05:12

## 2017-02-09 RX ADMIN — Medication 240 MILLIGRAM(S): at 17:44

## 2017-02-09 RX ADMIN — Medication 40 MILLIGRAM(S): at 05:12

## 2017-02-09 RX ADMIN — Medication 240 MILLIGRAM(S): at 05:12

## 2017-02-09 NOTE — PROGRESS NOTE ADULT - SUBJECTIVE AND OBJECTIVE BOX
[facility]                                 Denise Byers, & Paco                                  [address]                                        [place], [place], [address]                                             [phone]      Vital signs:  T(C): 36.9, Max: 36.9 (02-09 @ 00:03)  HR: 68 (66 - 74)  BP: 158/88 (150/80 - 160/80)  RR: 18 (18 - 19)  SpO2: --  Wt(kg): --    Exam:    No new complaints.  no dizzy episodes overnight  orthostatic BP/HR did not show significant drop.  Awake and alert.  speech language intact  Pupils react.  Face symmetric smile and sensation  hearing symmetric  tongue ML  5/5 power in 4 ext, mild weakness in legs due to edema  no drift  decreased sensation in feet    Seeing how this event happened also while laying down would suggest prolonged cardiac monitoring on discharge.  continue to monitor orthostatic vitals    will follow with you    Piyush Walker MD PhD   [id number]

## 2017-02-09 NOTE — PROGRESS NOTE ADULT - SUBJECTIVE AND OBJECTIVE BOX
SUBJECTIVE    REVIEW OF SYSTEMS    General: Not in any pain	    Skin/Breast: No rash  	  ENMT: No visual problems, no sore throat	    Respiratory and Thorax: No cough, No CP, No SOB  	  Cardiovascular: No CP, No palpitations    Gastrointestinal: No Abd pain, No N/V/D    Musculoskeletal: No Joint pain, No back pain	    Neurological: No headache    Psychiatric: No anxiety      OBJECTIVE    Vital Signs Last 24 Hrs  T(C): 36.7, Max: 36.9 (02-09 @ 00:03)  T(F): 98, Max: 98.5 (02-09 @ 00:03)  HR: 68 (61 - 68)  BP: 152/90 (152/90 - 158/88)  BP(mean): --  RR: 18 (18 - 18)  SpO2: 97% (97% - 97%)    PHYSICAL EXAM:    Constitutional: Not in any distress    Eyes: No conjunctival injection    ENMT: No oral lesions    Neck: No nodes, no adenopathy    Back: Straight, no defects    Respiratory: clear b/l    Cardiovascular: RRR, no murmur    Gastrointestinal: soft, NT, ND    Extremities: No edema, no erythema    Neurological: no focal deficit    Skin: No rash      MEDICATIONS  (STANDING):  insulin lispro (HumaLOG) corrective regimen sliding scale  SubCutaneous three times a day before meals  dextrose 5%. 1000milliLiter(s) IV Continuous <Continuous>  dextrose 50% Injectable 12.5Gram(s) IV Push once  dextrose 50% Injectable 25Gram(s) IV Push once  dextrose 50% Injectable 25Gram(s) IV Push once  atorvastatin 40milliGRAM(s) Oral at bedtime  furosemide    Tablet 40milliGRAM(s) Oral daily  pantoprazole    Tablet 40milliGRAM(s) Oral two times a day before meals  traMADol 50milliGRAM(s) Oral two times a day  amiodarone    Tablet 100milliGRAM(s) Oral daily  diltiazem   CD 240milliGRAM(s) Oral <User Schedule>  warfarin 7.5milliGRAM(s) Oral once    MEDICATIONS  (PRN):  dextrose Gel 1Dose(s) Oral once PRN Blood Glucose LESS THAN 70 milliGRAM(s)/deciliter  glucagon  Injectable 1milliGRAM(s) IntraMuscular once PRN Glucose LESS THAN 70 milligrams/deciliter                              11.7   3.25  )-----------( 204      ( 09 Feb 2017 05:42 )             37.0     08 Feb 2017 07:09    144    |  104    |  13.0   ----------------------------<  113    3.6     |  26.0   |  0.94     Ca    9.2        08 Feb 2017 07:09      Allergies    clonidine (Unknown)  Darvocet-N 100 (Unknown)  enalapril (Unknown)  hydrochlorothiazide (Unknown)  Keflex (Unknown)  Lyrica (Unknown)  metoprolol (Unknown)  penicillin (Unknown)  sulfa drugs (Unknown)  tetracycline (Unknown)    Intolerances

## 2017-02-09 NOTE — PROGRESS NOTE ADULT - SUBJECTIVE AND OBJECTIVE BOX
INTERVAL HISTORY:  	  MEDICATIONS:  furosemide    Tablet 40milliGRAM(s) Oral daily  amiodarone    Tablet 100milliGRAM(s) Oral daily  diltiazem   CD 240milliGRAM(s) Oral <User Schedule>        traMADol 50milliGRAM(s) Oral two times a day    pantoprazole    Tablet 40milliGRAM(s) Oral two times a day before meals    insulin lispro (HumaLOG) corrective regimen sliding scale  SubCutaneous three times a day before meals  dextrose Gel 1Dose(s) Oral once PRN  dextrose 50% Injectable 12.5Gram(s) IV Push once  dextrose 50% Injectable 25Gram(s) IV Push once  dextrose 50% Injectable 25Gram(s) IV Push once  glucagon  Injectable 1milliGRAM(s) IntraMuscular once PRN  atorvastatin 40milliGRAM(s) Oral at bedtime    dextrose 5%. 1000milliLiter(s) IV Continuous <Continuous>        PHYSICAL EXAM:  T(C): 36.6, Max: 36.9 (02-09 @ 00:03)  HR: 61 (61 - 69)  BP: 156/74 (150/80 - 158/88)  RR: 18 (18 - 18)  SpO2: 97% (97% - 97%)  Wt(kg): --  I&O's Summary        Appearance: Normal	  HEENT:   Normal oral mucosa, PERRL, EOMI	  Lymphatic: No lymphadenopathy  Cardiovascular: Normal S1 S2, No JVD, No murmurs, No edema  Respiratory: Lungs clear to auscultation	  Psychiatry: A & O x 3, Mood & affect appropriate  Gastrointestinal:  Soft, Non-tender, + BS	  Skin: No rashes, No ecchymoses, No cyanosis  Neurologic: Non-focal  Extremities: Normal range of motion, No clubbing, cyanosis or edema  Vascular: Peripheral pulses palpable 2+ bilaterally    TELEMETRY: 	    ECG:  	  RADIOLOGY:   DIAGNOSTIC TESTING:  [ ] Echocardiogram:  [ ]  Catheterization:  [ ] Stress Test:    OTHER: 	    LABS:	 	    CARDIAC MARKERS:                                  11.7   3.25  )-----------( 204      ( 09 Feb 2017 05:42 )             37.0     08 Feb 2017 07:09    144    |  104    |  13.0   ----------------------------<  113    3.6     |  26.0   |  0.94     Ca    9.2        08 Feb 2017 07:09      proBNP:   Lipid Profile:   HgA1c:   TSH:     ASSESSMENT/PLAN: Mi Aragon is a 76F with lightheadedness.  No sx to suggest ACS. Hx of A flutter with recent DCCV.  Recent DCCV:  INR management per Medicine.  Will need out-pt f/u of INR. Pt has home lab monitoring  No sx of ACS. Hx of normal coronaries. Normal LV function.  Outpt ischemic eval.  Will follow pt PRN, THANK YOU

## 2017-02-10 DIAGNOSIS — R55 SYNCOPE AND COLLAPSE: ICD-10-CM

## 2017-02-10 LAB
INR BLD: 1.63 RATIO — HIGH (ref 0.88–1.16)
PROTHROM AB SERPL-ACNC: 18 SEC — HIGH (ref 10–13.1)

## 2017-02-10 PROCEDURE — 33282: CPT

## 2017-02-10 PROCEDURE — 99222 1ST HOSP IP/OBS MODERATE 55: CPT | Mod: 57

## 2017-02-10 RX ORDER — WARFARIN SODIUM 2.5 MG/1
7.5 TABLET ORAL ONCE
Qty: 0 | Refills: 0 | Status: COMPLETED | OUTPATIENT
Start: 2017-02-10 | End: 2017-02-10

## 2017-02-10 RX ADMIN — PANTOPRAZOLE SODIUM 40 MILLIGRAM(S): 20 TABLET, DELAYED RELEASE ORAL at 18:28

## 2017-02-10 RX ADMIN — Medication 240 MILLIGRAM(S): at 18:28

## 2017-02-10 RX ADMIN — PANTOPRAZOLE SODIUM 40 MILLIGRAM(S): 20 TABLET, DELAYED RELEASE ORAL at 06:14

## 2017-02-10 RX ADMIN — TRAMADOL HYDROCHLORIDE 50 MILLIGRAM(S): 50 TABLET ORAL at 18:29

## 2017-02-10 RX ADMIN — TRAMADOL HYDROCHLORIDE 50 MILLIGRAM(S): 50 TABLET ORAL at 18:28

## 2017-02-10 RX ADMIN — WARFARIN SODIUM 7.5 MILLIGRAM(S): 2.5 TABLET ORAL at 23:23

## 2017-02-10 RX ADMIN — AMIODARONE HYDROCHLORIDE 100 MILLIGRAM(S): 400 TABLET ORAL at 06:15

## 2017-02-10 RX ADMIN — Medication 240 MILLIGRAM(S): at 06:14

## 2017-02-10 RX ADMIN — Medication 40 MILLIGRAM(S): at 06:14

## 2017-02-10 RX ADMIN — ATORVASTATIN CALCIUM 40 MILLIGRAM(S): 80 TABLET, FILM COATED ORAL at 23:22

## 2017-02-10 NOTE — PROGRESS NOTE ADULT - SUBJECTIVE AND OBJECTIVE BOX
[facility]                                 Denise Byers, & Paco                                  [address]                                        [place], [place], [address]                                             [phone]        No new complaints.  No dizziness at present.    Awake and alert.  Pupils react.  Face symmetric.  Good strength bilaterally.

## 2017-02-10 NOTE — CONSULT NOTE ADULT - SUBJECTIVE AND OBJECTIVE BOX
Patient is 76 year old female with past medical history of chronic atrial fibrillation, gastric ectasia, diabetes, hypertension, hyperlipidemia who came into the hospital for feeling of lightheadedness and feeling like she was about to pass out which appears when she walks. The episodes are accompanied by palpitaitons, as well as feeling like the world is spinning around her. She has had these episodes on and off for the past month.  Denies chest pain, denies orthopnea or paroxysmal nocturnal dyspnea, recent weight gain. She sleeps with 2-3 pillows at night for comfort.   Denies neurological symptoms such as weakness, loss of sensation, difficulty speaking, visual disturbance.  Denies hearing loss, denies tinnitus denies recent viral infections  She says she has been eating normally, and she has not been started on any new medications recently. 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 The episodes are accompanied by palpitaitons, as well as feeling like the world is spinning around her. She has had these episodes on and off for the past month.  Denies chest pain, denies orthopnea or paroxysmal nocturnal dyspnea, recent weight gain. She sleeps with 2-3 pillows at night for comfort.   Denies neurological symptoms such as weakness, loss of sensation, difficulty speaking, visual disturbance.  Denies hearing loss, denies tinnitus denies recent viral infections  She says she has been eating normally, and she has not been started on any new medications recently.     Previous imaging:  TTE: 55-60%, mild atrial enlargement, mild septal ventricular hypertrophy     PMH: rheumatic fever, chronic atrial fibrillation, diabetes, hypertension  PSH: appendectomy, hysterectomy  Allergies: penicillin, keflex, sulfa drugs, enalapril, metoprolol (intolerance: headache), hydrochlorothiazide, clonidine  Medications:  furosdemide 40 mg q.d., diltiazem 240 BID, amiodarone 100 mg q.d. coumadin, pantoprazole, lipitor    Physical exam:  General: NAD, lying comofrtably in bed   HEENT: EOMI/SKYLA, throat no exudates, no erythema    Neck: No JVD, supple, normal thyroid gland  Cardiac: s1/s2/  irregular/no s4 or s3, no murmurs are audible  Pulmonary:   clear to auscultation bilaterally, no rales, no ronchi  Abdominal: soft, no guarding or rebound, bowel sounds positive  Extremities: slight calf tenderness bilaterally, grade 2 non-pitting edema, no cyanosis   Neurological: alert and oriented x 3, motor 5/5, sensations intact, unable to evaluate Romberg patient is unsteady on feet    Labs:                          11.7   3.25  )-----------( 204      ( 09 Feb 2017 05:42 )             37.0     Chem:      Magnesium:   Phosphorus:       Imaging: Patient is 76 year old female with past medical history of chronic atrial fibrillation, 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 The episodes are accompanied by palpitaitons, as well as feeling like the world is spinning around her. She has had these episodes on and off for the past month.  Denies chest pain, denies orthopnea or paroxysmal nocturnal dyspnea, recent weight gain. She sleeps with 2-3 pillows at night for comfort.   Denies neurological symptoms such as weakness, loss of sensation, difficulty speaking, visual disturbance.  Denies hearing loss, denies tinnitus denies recent viral infections  She says she has been eating normally, and she has not been started on any new medications recently.     Previous imaging:  TTE: 55-60%, mild atrial enlargement, mild septal ventricular hypertrophy     PMH: rheumatic fever, chronic atrial fibrillation, diabetes, hypertension  PSH: appendectomy, hysterectomy  Allergies: penicillin, keflex, sulfa drugs, enalapril, metoprolol (intolerance: headache), hydrochlorothiazide, clonidine  Medications:  furosdemide 40 mg q.d., diltiazem 240 BID, amiodarone 100 mg q.d. coumadin, pantoprazole, lipitor    Physical exam:  General: NAD, lying comofrtably in bed   HEENT: EOMI/SKYLA, throat no exudates, no erythema    Neck: No JVD, supple, normal thyroid gland  Cardiac: s1/s2/  irregular/no s4 or s3, no murmurs are audible  Pulmonary:   clear to auscultation bilaterally, no rales, no ronchi  Abdominal: soft, no guarding or rebound, bowel sounds positive  Extremities: slight calf tenderness bilaterally, grade 2 non-pitting edema, no cyanosis   Neurological: alert and oriented x 3, motor 5/5, sensations intact, unable to evaluate Romberg patient is unsteady on feet    Labs:                          11.7   3.25  )-----------( 204      ( 09 Feb 2017 05:42 )             37.0     Chem:      Magnesium:   Phosphorus:       Imaging:   Calf Ultrasound: negative for DVT  CT scan of the head: negative, parotid nodule 1.1 cm in size Patient is 76 year old female with past medical history of chronic atrial fibrillation, 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 The episodes are accompanied by palpitaitons, as well as feeling like the world is spinning around her. She has had these episodes on and off for the past month.  Denies chest pain, denies orthopnea or paroxysmal nocturnal dyspnea, recent weight gain. She sleeps with 2-3 pillows at night for comfort.   Denies neurological symptoms such as weakness, loss of sensation, difficulty speaking, visual disturbance.  Denies hearing loss, denies tinnitus denies recent viral infections  She says she has been eating normally, and she has not been started on any new medications recently.     Previous imaging:  TTE: 55-60%, mild atrial enlargement, mild septal ventricular hypertrophy     PMH: rheumatic fever, chronic atrial fibrillation, diabetes, hypertension  PSH: appendectomy, hysterectomy  FH: N/c to primary problem.  SH: Lives alone  Smoke: 30 yr hx currently non-smoker  Etoh: Social    Allergies: penicillin, keflex, sulfa drugs, enalapril, metoprolol (intolerance: headache), hydrochlorothiazide, clonidine.    Medications:  furosdemide 40 mg q.d., diltiazem 240 BID, amiodarone 100 mg q.d. coumadin, pantoprazole, lipitor    MEDICATIONS  (STANDING):  insulin lispro (HumaLOG) corrective regimen sliding scale  SubCutaneous three times a day before meals  dextrose 5%. 1000milliLiter(s) IV Continuous <Continuous>  dextrose 50% Injectable 12.5Gram(s) IV Push once  dextrose 50% Injectable 25Gram(s) IV Push once  dextrose 50% Injectable 25Gram(s) IV Push once  atorvastatin 40milliGRAM(s) Oral at bedtime  furosemide    Tablet 40milliGRAM(s) Oral daily  pantoprazole    Tablet 40milliGRAM(s) Oral two times a day before meals  traMADol 50milliGRAM(s) Oral two times a day  amiodarone    Tablet 100milliGRAM(s) Oral daily  diltiazem   CD 240milliGRAM(s) Oral <User Schedule>  warfarin 7.5milliGRAM(s) Oral once    MEDICATIONS  (PRN):  dextrose Gel 1Dose(s) Oral once PRN Blood Glucose LESS THAN 70 milliGRAM(s)/deciliter  glucagon  Injectable 1milliGRAM(s) IntraMuscular once PRN Glucose LESS THAN 70 milligrams/decilite    Physical exam:  General: NAD, lying comofrtably in bed   HEENT: EOMI/SKYLA, throat no exudates, no erythema    Neck: No JVD, supple, normal thyroid gland  Cardiac: s1/s2/  irregular/no s4 or s3, no murmurs are audible  Pulmonary:   clear to auscultation bilaterally, no rales, no ronchi  Abdominal: soft, no guarding or rebound, bowel sounds positive  Extremities: slight calf tenderness bilaterally, grade 2 non-pitting edema, no cyanosis   Neurological: alert and oriented x 3, motor 5/5, sensations intact, unable to evaluate Romberg patient is unsteady on feet    Labs:                          11.7   3.25  )-----------( 204      ( 09 Feb 2017 05:42 )             37.0     Chem:      Magnesium:   Phosphorus:       Imaging:   Calf Ultrasound: negative for DVT    CT scan of the head: negative, parotid nodule 1.1 cm in size

## 2017-02-10 NOTE — CONSULT NOTE ADULT - PROBLEM SELECTOR RECOMMENDATION 9
- recommend continuing on current dose of amiodarone 100 mg q.d. , diltiazem 240 mg b.i.d., coumadin 7.5 mg q.d.   - recommend implantable loop recorder   - magnesium, phosphorus  - orthostatic vital signs

## 2017-02-10 NOTE — CONSULT NOTE ADULT - ASSESSMENT
77y/o female hx afib on coumadin, HTN, DM, iron deficiency anemia p/w c/o lightheadedness. In ER pt found to be hypertensive.  CT brain negative for acute events.  Pt admitted for uncontrolled HTN with gait instability possibly vertigo.  agree with current management - BP control, monitoring cbc, adjusting coumadin for subtherapeutic INR  pt s/p outpt feraheme for iron deficiency anemia with signifcant improvement in her hgb  pt undergoing outpt GI evaluation for etiology of iron deficiency  pt to f/u in office as outpt for continued monitoring of her anemia and IV iron as needed  continue present supportive care, avail prn
The patient is a 76y Female with light headed dizziness.  will check orthostatic vitals as she may have elements of an autonomic neuropathy due to diabetes.  However it may be risky to prescribe midodrine or florinef given her HTN  will follow with you    Piyush Walker MD PhD   [id number]
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 Patient also states she had very high blood pressure when she was admitted, but there's no record of high blood pressures.  Rule out cardiogenic cause of pre-syncope.

## 2017-02-10 NOTE — PROGRESS NOTE ADULT - SUBJECTIVE AND OBJECTIVE BOX
SUBJECTIVE    REVIEW OF SYSTEMS    General: Not in any pain	    Skin/Breast: No rash  	  ENMT: No visual problems, no sore throat	    Respiratory and Thorax: No cough, No CP, No SOB  	  Cardiovascular: No CP, No palpitations    Gastrointestinal: No Abd pain, No N/V/D    Musculoskeletal: No Joint pain, No back pain	    Neurological: No headache    Psychiatric: No anxiety      OBJECTIVE    Vital Signs Last 24 Hrs  T(C): 37, Max: 37.4 (02-09 @ 23:25)  T(F): 98.6, Max: 99.4 (02-09 @ 23:25)  HR: 66 (61 - 68)  BP: 136/82 (136/82 - 161/81)  BP(mean): --  RR: 18 (18 - 18)  SpO2: 98% (98% - 98%)    PHYSICAL EXAM:    Constitutional: Not in any distress    Eyes: No conjunctival injection    ENMT: No oral lesions    Neck: No nodes, no adenopathy    Back: Straight, no defects    Respiratory: clear b/l    Cardiovascular: RRR, no murmur    Gastrointestinal: soft, NT, ND    Extremities: No edema, no erythema    Neurological: no focal deficit    Skin: No rash      MEDICATIONS  (STANDING):  insulin lispro (HumaLOG) corrective regimen sliding scale  SubCutaneous three times a day before meals  dextrose 5%. 1000milliLiter(s) IV Continuous <Continuous>  dextrose 50% Injectable 12.5Gram(s) IV Push once  dextrose 50% Injectable 25Gram(s) IV Push once  dextrose 50% Injectable 25Gram(s) IV Push once  atorvastatin 40milliGRAM(s) Oral at bedtime  furosemide    Tablet 40milliGRAM(s) Oral daily  pantoprazole    Tablet 40milliGRAM(s) Oral two times a day before meals  traMADol 50milliGRAM(s) Oral two times a day  amiodarone    Tablet 100milliGRAM(s) Oral daily  diltiazem   CD 240milliGRAM(s) Oral <User Schedule>    MEDICATIONS  (PRN):  dextrose Gel 1Dose(s) Oral once PRN Blood Glucose LESS THAN 70 milliGRAM(s)/deciliter  glucagon  Injectable 1milliGRAM(s) IntraMuscular once PRN Glucose LESS THAN 70 milligrams/deciliter                              11.7   3.25  )-----------( 204      ( 09 Feb 2017 05:42 )             37.0           Allergies    clonidine (Unknown)  Darvocet-N 100 (Unknown)  enalapril (Unknown)  hydrochlorothiazide (Unknown)  Keflex (Unknown)  Lyrica (Unknown)  metoprolol (Unknown)  penicillin (Unknown)  sulfa drugs (Unknown)  tetracycline (Unknown)    Intolerances

## 2017-02-10 NOTE — CONSULT NOTE ADULT - ATTENDING COMMENTS
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 In ED reports of elevated SBP > 200mmhg. Patient with recent normal LVEF 55% 12/2016 . Currently hemodynamically stable.    Pre-syncope/dizziness: DDX HTN urgency vs vasovagal vs occult  arrhythmia.    Recc;     Problem/plan1: Dizziness: Check orthostatic BP, Neuro eval, consider ILR for further risk stratification. Consider changing Lasix to QOD  Problem/plan 2: PAF: Continue Amiodarone 100mg po daily , Diltiazem and Warfarin goal INR 2-3, check TSH  Problem/Plan 3: HTN: BP optimization on going  Problem/Plan 4:h/o  LE DVT; TIO (-) on Coumadin for AF  Problem/Plan: 5: gait instability: PT eval pending

## 2017-02-10 NOTE — PROGRESS NOTE ADULT - ASSESSMENT
The patient is a 76y Female with episodes of lightheadedness.    Neurologic status stable.    Can follow with us in the office as outpatient if symptoms persist.     Will be available as needed. Please call if further questions arise.

## 2017-02-11 ENCOUNTER — TRANSCRIPTION ENCOUNTER (OUTPATIENT)
Age: 77
End: 2017-02-11

## 2017-02-11 VITALS
SYSTOLIC BLOOD PRESSURE: 149 MMHG | HEART RATE: 67 BPM | DIASTOLIC BLOOD PRESSURE: 73 MMHG | TEMPERATURE: 98 F | OXYGEN SATURATION: 18 %

## 2017-02-11 LAB
INR BLD: 1.9 RATIO — HIGH (ref 0.88–1.16)
PROTHROM AB SERPL-ACNC: 21 SEC — HIGH (ref 10–13.1)

## 2017-02-11 PROCEDURE — 82550 ASSAY OF CK (CPK): CPT

## 2017-02-11 PROCEDURE — 84436 ASSAY OF TOTAL THYROXINE: CPT

## 2017-02-11 PROCEDURE — 93970 EXTREMITY STUDY: CPT

## 2017-02-11 PROCEDURE — 85610 PROTHROMBIN TIME: CPT

## 2017-02-11 PROCEDURE — 82728 ASSAY OF FERRITIN: CPT

## 2017-02-11 PROCEDURE — 80053 COMPREHEN METABOLIC PANEL: CPT

## 2017-02-11 PROCEDURE — 83036 HEMOGLOBIN GLYCOSYLATED A1C: CPT

## 2017-02-11 PROCEDURE — 84466 ASSAY OF TRANSFERRIN: CPT

## 2017-02-11 PROCEDURE — 96374 THER/PROPH/DIAG INJ IV PUSH: CPT

## 2017-02-11 PROCEDURE — 93005 ELECTROCARDIOGRAM TRACING: CPT

## 2017-02-11 PROCEDURE — 80048 BASIC METABOLIC PNL TOTAL CA: CPT

## 2017-02-11 PROCEDURE — 96372 THER/PROPH/DIAG INJ SC/IM: CPT | Mod: XU

## 2017-02-11 PROCEDURE — 83550 IRON BINDING TEST: CPT

## 2017-02-11 PROCEDURE — 70450 CT HEAD/BRAIN W/O DYE: CPT

## 2017-02-11 PROCEDURE — 84480 ASSAY TRIIODOTHYRONINE (T3): CPT

## 2017-02-11 PROCEDURE — 85027 COMPLETE CBC AUTOMATED: CPT

## 2017-02-11 PROCEDURE — 85379 FIBRIN DEGRADATION QUANT: CPT

## 2017-02-11 PROCEDURE — 84484 ASSAY OF TROPONIN QUANT: CPT

## 2017-02-11 PROCEDURE — 99285 EMERGENCY DEPT VISIT HI MDM: CPT | Mod: 25

## 2017-02-11 PROCEDURE — 36415 COLL VENOUS BLD VENIPUNCTURE: CPT

## 2017-02-11 PROCEDURE — 84443 ASSAY THYROID STIM HORMONE: CPT

## 2017-02-11 RX ORDER — WARFARIN SODIUM 2.5 MG/1
7.5 TABLET ORAL ONCE
Qty: 0 | Refills: 0 | Status: DISCONTINUED | OUTPATIENT
Start: 2017-02-11 | End: 2017-02-11

## 2017-02-11 RX ORDER — INSULIN LISPRO 100/ML
0 VIAL (ML) SUBCUTANEOUS
Qty: 0 | Refills: 0 | COMMUNITY
Start: 2017-02-11

## 2017-02-11 RX ADMIN — PANTOPRAZOLE SODIUM 40 MILLIGRAM(S): 20 TABLET, DELAYED RELEASE ORAL at 16:47

## 2017-02-11 RX ADMIN — Medication 240 MILLIGRAM(S): at 16:47

## 2017-02-11 RX ADMIN — Medication 240 MILLIGRAM(S): at 06:52

## 2017-02-11 RX ADMIN — AMIODARONE HYDROCHLORIDE 100 MILLIGRAM(S): 400 TABLET ORAL at 06:51

## 2017-02-11 RX ADMIN — PANTOPRAZOLE SODIUM 40 MILLIGRAM(S): 20 TABLET, DELAYED RELEASE ORAL at 06:52

## 2017-02-11 NOTE — DISCHARGE NOTE ADULT - CARE PLAN
Principal Discharge DX:	Dizziness  Goal:	home  Instructions for follow-up, activity and diet:	low salt diet  Secondary Diagnosis:	Essential hypertension  Secondary Diagnosis:	Chronic atrial fibrillation  Secondary Diagnosis:	Gait instability

## 2017-02-11 NOTE — DISCHARGE NOTE ADULT - MEDICATION SUMMARY - MEDICATIONS TO STOP TAKING
I will STOP taking the medications listed below when I get home from the hospital:    Multaq 400 mg oral tablet  -- 1 tab(s) by mouth once a day (at bedtime)    Ultracet 37.5 mg-325 mg oral tablet  -- 1 tab(s) by mouth every 4 hours

## 2017-02-11 NOTE — PROGRESS NOTE ADULT - SUBJECTIVE AND OBJECTIVE BOX
SUBJECTIVE    REVIEW OF SYSTEMS    General: Not in any pain	    Skin/Breast: No rash  	  ENMT: No visual problems, no sore throat	    Respiratory and Thorax: No cough, No CP, No SOB  	  Cardiovascular: No CP, No palpitations    Gastrointestinal: No Abd pain, No N/V/D    Musculoskeletal: No Joint pain, No back pain	    Neurological: No headache    Psychiatric: No anxiety      OBJECTIVE    Vital Signs Last 24 Hrs  T(C): 36.4, Max: 37 (02-10 @ 15:55)  T(F): 97.5, Max: 98.6 (02-10 @ 15:55)  HR: 58 (58 - 74)  BP: 148/75 (136/76 - 148/75)  BP(mean): --  RR: 18 (18 - 18)  SpO2: 97% (97% - 98%)    PHYSICAL EXAM:    Constitutional: Not in any distress    Eyes: No conjunctival injection    ENMT: No oral lesions    Neck: No nodes, no adenopathy    Back: Straight, no defects    Respiratory: clear b/l    Cardiovascular: RRR, no murmur    Gastrointestinal: soft, NT, ND    Extremities: No edema, no erythema    Neurological: no focal deficit    Skin: No rash      MEDICATIONS  (STANDING):  insulin lispro (HumaLOG) corrective regimen sliding scale  SubCutaneous three times a day before meals  dextrose 5%. 1000milliLiter(s) IV Continuous <Continuous>  dextrose 50% Injectable 12.5Gram(s) IV Push once  dextrose 50% Injectable 25Gram(s) IV Push once  dextrose 50% Injectable 25Gram(s) IV Push once  atorvastatin 40milliGRAM(s) Oral at bedtime  furosemide    Tablet 40milliGRAM(s) Oral daily  pantoprazole    Tablet 40milliGRAM(s) Oral two times a day before meals  traMADol 50milliGRAM(s) Oral two times a day  amiodarone    Tablet 100milliGRAM(s) Oral daily  diltiazem   CD 240milliGRAM(s) Oral <User Schedule>  warfarin 7.5milliGRAM(s) Oral once    MEDICATIONS  (PRN):  dextrose Gel 1Dose(s) Oral once PRN Blood Glucose LESS THAN 70 milliGRAM(s)/deciliter  glucagon  Injectable 1milliGRAM(s) IntraMuscular once PRN Glucose LESS THAN 70 milligrams/deciliter                Allergies    clonidine (Unknown)  Darvocet-N 100 (Unknown)  enalapril (Unknown)  hydrochlorothiazide (Unknown)  Keflex (Unknown)  Lyrica (Unknown)  metoprolol (Unknown)  penicillin (Unknown)  sulfa drugs (Unknown)  tetracycline (Unknown)    Intolerances

## 2017-02-11 NOTE — DISCHARGE NOTE ADULT - HOSPITAL COURSE
pt admitted for pre-syncope, uncontrolled htn  ct brain neg, leg doppler neg  pt had recent echo, ef 55%  pt had loop recorder placed  stable for d/c

## 2017-02-11 NOTE — DISCHARGE NOTE ADULT - CARE PROVIDER_API CALL
Eleuterio Mills), Family Medicine  [address] [place] [id number]  Phone: [phone]  Fax: [phone]

## 2017-02-11 NOTE — DISCHARGE NOTE ADULT - MEDICATION SUMMARY - MEDICATIONS TO TAKE
I will START or STAY ON the medications listed below when I get home from the hospital:    vitamin D  -- 2000 unit(s) by mouth once a day  -- Indication: For vitamin D    amiodarone 100 mg oral tablet  -- 2 tab(s) by mouth once a day  -- Indication: For Heart    diltiaZEM 240 mg/24 hours oral capsule, extended release  -- 1 cap(s) by mouth   -- Indication: For Heart    Coumadin  -- 5mg daily with none on sundays   -- Indication: For afib    insulin lispro 100 units/mL subcutaneous solution  --  subcutaneous 3 times a day (before meals); 2 Unit(s) if Glucose 151 - 200  4 Unit(s) if Glucose 201 - 250  6 Unit(s) if Glucose 251 - 300  8 Unit(s) if Glucose 301 - 350  10 Unit(s) if Glucose 351 - 400  12 Unit(s) if Glucose Greater Than 400  -- Indication: For Diabetes    Lipitor  --  by mouth   -- Indication: For Cholesterol    furosemide 40 mg oral tablet  -- 1 tab(s) by mouth once a day  -- Indication: For water pill    pantoprazole  -- 40  by mouth once a day (in the morning)  -- Indication: For Gerd

## 2017-02-11 NOTE — PHYSICAL THERAPY INITIAL EVALUATION ADULT - ADDITIONAL COMMENTS
Pt reports living with daughter in an apartment with 7 steps to enter through back (pt's main entrance) (+) rails. No stairs indoors. Pt ambulates with straight cane, independently at baseline. Has had more difficulty with intermittent dizziness.

## 2017-02-11 NOTE — DISCHARGE NOTE ADULT - PATIENT PORTAL LINK FT
“You can access the [facility] Patient Portal, offered by [facility], by registering with the following website: [email or web address]”

## 2017-03-03 ENCOUNTER — APPOINTMENT (OUTPATIENT)
Dept: ELECTROPHYSIOLOGY | Facility: CLINIC | Age: 77
End: 2017-03-03

## 2017-03-08 ENCOUNTER — NON-APPOINTMENT (OUTPATIENT)
Age: 77
End: 2017-03-08

## 2017-03-08 ENCOUNTER — APPOINTMENT (OUTPATIENT)
Dept: CARDIOLOGY | Facility: CLINIC | Age: 77
End: 2017-03-08

## 2017-03-08 VITALS
DIASTOLIC BLOOD PRESSURE: 82 MMHG | HEART RATE: 59 BPM | SYSTOLIC BLOOD PRESSURE: 146 MMHG | HEIGHT: 68 IN | WEIGHT: 265 LBS | OXYGEN SATURATION: 99 % | BODY MASS INDEX: 40.16 KG/M2

## 2017-03-08 VITALS — SYSTOLIC BLOOD PRESSURE: 150 MMHG | DIASTOLIC BLOOD PRESSURE: 83 MMHG

## 2017-03-08 DIAGNOSIS — R09.89 OTHER SPECIFIED SYMPTOMS AND SIGNS INVOLVING THE CIRCULATORY AND RESPIRATORY SYSTEMS: ICD-10-CM

## 2017-03-08 DIAGNOSIS — Z87.11 PERSONAL HISTORY OF PEPTIC ULCER DISEASE: ICD-10-CM

## 2017-03-08 DIAGNOSIS — I87.2 VENOUS INSUFFICIENCY (CHRONIC) (PERIPHERAL): ICD-10-CM

## 2017-03-08 DIAGNOSIS — Z87.19 PERSONAL HISTORY OF OTHER DISEASES OF THE DIGESTIVE SYSTEM: ICD-10-CM

## 2017-03-08 DIAGNOSIS — Z86.39 PERSONAL HISTORY OF OTHER ENDOCRINE, NUTRITIONAL AND METABOLIC DISEASE: ICD-10-CM

## 2017-03-08 DIAGNOSIS — T14.8 OTHER INJURY OF UNSPECIFIED BODY REGION: ICD-10-CM

## 2017-03-08 DIAGNOSIS — R73.03 PREDIABETES.: ICD-10-CM

## 2017-03-08 DIAGNOSIS — I49.9 CARDIAC ARRHYTHMIA, UNSPECIFIED: ICD-10-CM

## 2017-03-08 DIAGNOSIS — Z87.39 PERSONAL HISTORY OF OTHER DISEASES OF THE MUSCULOSKELETAL SYSTEM AND CONNECTIVE TISSUE: ICD-10-CM

## 2017-03-08 RX ORDER — DILTIAZEM HYDROCHLORIDE 240 MG/1
240 CAPSULE, COATED, EXTENDED RELEASE ORAL
Refills: 0 | Status: DISCONTINUED | COMMUNITY
End: 2017-03-08

## 2017-03-22 ENCOUNTER — APPOINTMENT (OUTPATIENT)
Dept: CARDIOLOGY | Facility: CLINIC | Age: 77
End: 2017-03-22

## 2017-03-28 ENCOUNTER — APPOINTMENT (OUTPATIENT)
Dept: CARDIOLOGY | Facility: CLINIC | Age: 77
End: 2017-03-28

## 2017-03-30 ENCOUNTER — OUTPATIENT (OUTPATIENT)
Dept: OUTPATIENT SERVICES | Facility: HOSPITAL | Age: 77
LOS: 1 days | End: 2017-03-30
Payer: MEDICARE

## 2017-03-30 DIAGNOSIS — R09.89 OTHER SPECIFIED SYMPTOMS AND SIGNS INVOLVING THE CIRCULATORY AND RESPIRATORY SYSTEMS: ICD-10-CM

## 2017-03-30 DIAGNOSIS — Z98.1 ARTHRODESIS STATUS: Chronic | ICD-10-CM

## 2017-03-30 DIAGNOSIS — Z98.89 OTHER SPECIFIED POSTPROCEDURAL STATES: Chronic | ICD-10-CM

## 2017-03-30 DIAGNOSIS — Z90.710 ACQUIRED ABSENCE OF BOTH CERVIX AND UTERUS: Chronic | ICD-10-CM

## 2017-03-30 PROCEDURE — 93016 CV STRESS TEST SUPVJ ONLY: CPT

## 2017-03-30 PROCEDURE — A9500: CPT

## 2017-03-30 PROCEDURE — 78452 HT MUSCLE IMAGE SPECT MULT: CPT | Mod: 26

## 2017-03-30 PROCEDURE — 78452 HT MUSCLE IMAGE SPECT MULT: CPT

## 2017-03-30 PROCEDURE — 93018 CV STRESS TEST I&R ONLY: CPT

## 2017-03-30 PROCEDURE — 93017 CV STRESS TEST TRACING ONLY: CPT

## 2017-04-04 DIAGNOSIS — R09.89 OTHER SPECIFIED SYMPTOMS AND SIGNS INVOLVING THE CIRCULATORY AND RESPIRATORY SYSTEMS: ICD-10-CM

## 2017-04-07 ENCOUNTER — APPOINTMENT (OUTPATIENT)
Dept: ELECTROPHYSIOLOGY | Facility: CLINIC | Age: 77
End: 2017-04-07

## 2017-04-11 ENCOUNTER — OUTPATIENT (OUTPATIENT)
Dept: OUTPATIENT SERVICES | Facility: HOSPITAL | Age: 77
LOS: 1 days | End: 2017-04-11
Payer: MEDICARE

## 2017-04-11 DIAGNOSIS — Z98.1 ARTHRODESIS STATUS: Chronic | ICD-10-CM

## 2017-04-11 DIAGNOSIS — Z98.89 OTHER SPECIFIED POSTPROCEDURAL STATES: Chronic | ICD-10-CM

## 2017-04-11 DIAGNOSIS — Z90.710 ACQUIRED ABSENCE OF BOTH CERVIX AND UTERUS: Chronic | ICD-10-CM

## 2017-04-11 DIAGNOSIS — R07.89 OTHER CHEST PAIN: ICD-10-CM

## 2017-04-11 LAB
BUN SERPL-MCNC: 12 MG/DL — SIGNIFICANT CHANGE UP (ref 8–20)
CREAT SERPL-MCNC: 0.89 MG/DL — SIGNIFICANT CHANGE UP (ref 0.5–1.3)

## 2017-04-11 PROCEDURE — 36415 COLL VENOUS BLD VENIPUNCTURE: CPT

## 2017-04-11 PROCEDURE — 84520 ASSAY OF UREA NITROGEN: CPT

## 2017-04-11 PROCEDURE — 71275 CT ANGIOGRAPHY CHEST: CPT

## 2017-04-11 PROCEDURE — 71275 CT ANGIOGRAPHY CHEST: CPT | Mod: 26

## 2017-04-11 PROCEDURE — 82565 ASSAY OF CREATININE: CPT

## 2017-04-18 ENCOUNTER — OUTPATIENT (OUTPATIENT)
Dept: OUTPATIENT SERVICES | Facility: HOSPITAL | Age: 77
LOS: 1 days | End: 2017-04-18
Payer: MEDICARE

## 2017-04-18 DIAGNOSIS — R94.09 ABNORMAL RESULTS OF OTHER FUNCTION STUDIES OF CENTRAL NERVOUS SYSTEM: ICD-10-CM

## 2017-04-18 DIAGNOSIS — Z98.89 OTHER SPECIFIED POSTPROCEDURAL STATES: Chronic | ICD-10-CM

## 2017-04-18 DIAGNOSIS — Z98.1 ARTHRODESIS STATUS: Chronic | ICD-10-CM

## 2017-04-18 DIAGNOSIS — Z90.710 ACQUIRED ABSENCE OF BOTH CERVIX AND UTERUS: Chronic | ICD-10-CM

## 2017-04-24 ENCOUNTER — APPOINTMENT (OUTPATIENT)
Dept: CARDIOLOGY | Facility: CLINIC | Age: 77
End: 2017-04-24

## 2017-04-24 VITALS — DIASTOLIC BLOOD PRESSURE: 90 MMHG | SYSTOLIC BLOOD PRESSURE: 160 MMHG

## 2017-04-24 VITALS
DIASTOLIC BLOOD PRESSURE: 68 MMHG | HEIGHT: 68 IN | OXYGEN SATURATION: 99 % | BODY MASS INDEX: 39.4 KG/M2 | SYSTOLIC BLOOD PRESSURE: 200 MMHG | WEIGHT: 260 LBS | HEART RATE: 69 BPM

## 2017-04-24 DIAGNOSIS — E04.1 NONTOXIC SINGLE THYROID NODULE: ICD-10-CM

## 2017-04-24 DIAGNOSIS — R94.39 ABNORMAL RESULT OF OTHER CARDIOVASCULAR FUNCTION STUDY: ICD-10-CM

## 2017-04-24 RX ORDER — WARFARIN 1 MG/1
1 TABLET ORAL
Qty: 90 | Refills: 0 | Status: DISCONTINUED | COMMUNITY
Start: 2017-04-12

## 2017-04-24 RX ORDER — FLUTICASONE PROPIONATE 50 UG/1
50 SPRAY, METERED NASAL
Qty: 16 | Refills: 0 | Status: DISCONTINUED | COMMUNITY
Start: 2016-10-25

## 2017-04-24 RX ORDER — ALPRAZOLAM 0.25 MG/1
0.25 TABLET ORAL
Qty: 20 | Refills: 0 | Status: DISCONTINUED | COMMUNITY
Start: 2017-01-23

## 2017-04-24 RX ORDER — NYSTATIN 100000 [USP'U]/G
100000 CREAM TOPICAL
Qty: 15 | Refills: 0 | Status: DISCONTINUED | COMMUNITY
Start: 2017-04-20

## 2017-04-24 RX ORDER — DILTIAZEM HYDROCHLORIDE 180 MG/1
180 CAPSULE, EXTENDED RELEASE ORAL
Qty: 180 | Refills: 0 | Status: DISCONTINUED | COMMUNITY
Start: 2016-10-30

## 2017-04-24 RX ORDER — LEVOFLOXACIN 500 MG/1
500 TABLET, FILM COATED ORAL
Qty: 7 | Refills: 0 | Status: DISCONTINUED | COMMUNITY
Start: 2016-10-25

## 2017-04-24 RX ORDER — DRONEDARONE 400 MG/1
400 TABLET, FILM COATED ORAL
Qty: 180 | Refills: 0 | Status: DISCONTINUED | COMMUNITY
Start: 2016-10-31

## 2017-05-08 ENCOUNTER — APPOINTMENT (OUTPATIENT)
Dept: ELECTROPHYSIOLOGY | Facility: CLINIC | Age: 77
End: 2017-05-08

## 2017-06-09 ENCOUNTER — APPOINTMENT (OUTPATIENT)
Dept: ELECTROPHYSIOLOGY | Facility: CLINIC | Age: 77
End: 2017-06-09

## 2017-06-20 ENCOUNTER — OUTPATIENT (OUTPATIENT)
Dept: OUTPATIENT SERVICES | Facility: HOSPITAL | Age: 77
LOS: 1 days | End: 2017-06-20

## 2017-06-20 DIAGNOSIS — Z98.89 OTHER SPECIFIED POSTPROCEDURAL STATES: Chronic | ICD-10-CM

## 2017-06-20 DIAGNOSIS — Z90.710 ACQUIRED ABSENCE OF BOTH CERVIX AND UTERUS: Chronic | ICD-10-CM

## 2017-06-20 DIAGNOSIS — R94.39 ABNORMAL RESULT OF OTHER CARDIOVASCULAR FUNCTION STUDY: ICD-10-CM

## 2017-06-20 DIAGNOSIS — Z98.1 ARTHRODESIS STATUS: Chronic | ICD-10-CM

## 2017-06-23 ENCOUNTER — OUTPATIENT (OUTPATIENT)
Dept: OUTPATIENT SERVICES | Facility: HOSPITAL | Age: 77
LOS: 1 days | End: 2017-06-23
Payer: MEDICARE

## 2017-06-23 VITALS
HEART RATE: 71 BPM | TEMPERATURE: 98 F | DIASTOLIC BLOOD PRESSURE: 84 MMHG | OXYGEN SATURATION: 99 % | HEIGHT: 68 IN | WEIGHT: 263.01 LBS | SYSTOLIC BLOOD PRESSURE: 177 MMHG | RESPIRATION RATE: 18 BRPM

## 2017-06-23 VITALS
HEART RATE: 71 BPM | OXYGEN SATURATION: 99 % | HEIGHT: 68 IN | DIASTOLIC BLOOD PRESSURE: 84 MMHG | TEMPERATURE: 98 F | SYSTOLIC BLOOD PRESSURE: 177 MMHG | WEIGHT: 263.01 LBS

## 2017-06-23 DIAGNOSIS — Z98.89 OTHER SPECIFIED POSTPROCEDURAL STATES: Chronic | ICD-10-CM

## 2017-06-23 DIAGNOSIS — Z01.810 ENCOUNTER FOR PREPROCEDURAL CARDIOVASCULAR EXAMINATION: ICD-10-CM

## 2017-06-23 DIAGNOSIS — Z90.710 ACQUIRED ABSENCE OF BOTH CERVIX AND UTERUS: Chronic | ICD-10-CM

## 2017-06-23 DIAGNOSIS — Z98.1 ARTHRODESIS STATUS: Chronic | ICD-10-CM

## 2017-06-23 LAB
ANION GAP SERPL CALC-SCNC: 15 MMOL/L — SIGNIFICANT CHANGE UP (ref 5–17)
APTT BLD: 36.1 SEC — SIGNIFICANT CHANGE UP (ref 27.5–37.4)
BASOPHILS # BLD AUTO: 0 K/UL — SIGNIFICANT CHANGE UP (ref 0–0.2)
BASOPHILS NFR BLD AUTO: 0.4 % — SIGNIFICANT CHANGE UP (ref 0–2)
BUN SERPL-MCNC: 15 MG/DL — SIGNIFICANT CHANGE UP (ref 8–20)
CALCIUM SERPL-MCNC: 8.6 MG/DL — SIGNIFICANT CHANGE UP (ref 8.6–10.2)
CHLORIDE SERPL-SCNC: 104 MMOL/L — SIGNIFICANT CHANGE UP (ref 98–107)
CO2 SERPL-SCNC: 22 MMOL/L — SIGNIFICANT CHANGE UP (ref 22–29)
CREAT SERPL-MCNC: 1 MG/DL — SIGNIFICANT CHANGE UP (ref 0.5–1.3)
EOSINOPHIL # BLD AUTO: 0.1 K/UL — SIGNIFICANT CHANGE UP (ref 0–0.5)
EOSINOPHIL NFR BLD AUTO: 2.5 % — SIGNIFICANT CHANGE UP (ref 0–6)
GLUCOSE SERPL-MCNC: 113 MG/DL — SIGNIFICANT CHANGE UP (ref 70–115)
HCT VFR BLD CALC: 25 % — LOW (ref 37–47)
HGB BLD-MCNC: 7.5 G/DL — LOW (ref 12–16)
INR BLD: 2.03 RATIO — HIGH (ref 0.88–1.16)
LYMPHOCYTES # BLD AUTO: 1 K/UL — SIGNIFICANT CHANGE UP (ref 1–4.8)
LYMPHOCYTES # BLD AUTO: 35.6 % — SIGNIFICANT CHANGE UP (ref 20–55)
MCHC RBC-ENTMCNC: 23.8 PG — LOW (ref 27–31)
MCHC RBC-ENTMCNC: 30 G/DL — LOW (ref 32–36)
MCV RBC AUTO: 79.4 FL — LOW (ref 81–99)
MONOCYTES # BLD AUTO: 0.4 K/UL — SIGNIFICANT CHANGE UP (ref 0–0.8)
MONOCYTES NFR BLD AUTO: 13.3 % — HIGH (ref 3–10)
NEUTROPHILS # BLD AUTO: 1.3 K/UL — LOW (ref 1.8–8)
NEUTROPHILS NFR BLD AUTO: 47.8 % — SIGNIFICANT CHANGE UP (ref 37–73)
PLATELET # BLD AUTO: 217 K/UL — SIGNIFICANT CHANGE UP (ref 150–400)
POTASSIUM SERPL-MCNC: 4.2 MMOL/L — SIGNIFICANT CHANGE UP (ref 3.5–5.3)
POTASSIUM SERPL-SCNC: 4.2 MMOL/L — SIGNIFICANT CHANGE UP (ref 3.5–5.3)
PROTHROM AB SERPL-ACNC: 22.6 SEC — HIGH (ref 9.8–12.7)
RBC # BLD: 3.15 M/UL — LOW (ref 4.4–5.2)
RBC # FLD: 15.8 % — HIGH (ref 11–15.6)
SODIUM SERPL-SCNC: 141 MMOL/L — SIGNIFICANT CHANGE UP (ref 135–145)
WBC # BLD: 2.8 K/UL — LOW (ref 4.8–10.8)
WBC # FLD AUTO: 2.8 K/UL — LOW (ref 4.8–10.8)

## 2017-06-23 PROCEDURE — 93010 ELECTROCARDIOGRAM REPORT: CPT

## 2017-06-23 RX ORDER — ATORVASTATIN CALCIUM 80 MG/1
1 TABLET, FILM COATED ORAL
Qty: 0 | Refills: 0 | COMMUNITY

## 2017-06-23 RX ORDER — AMIODARONE HYDROCHLORIDE 400 MG/1
1 TABLET ORAL
Qty: 0 | Refills: 0 | COMMUNITY

## 2017-06-23 RX ORDER — AMIODARONE HYDROCHLORIDE 400 MG/1
2 TABLET ORAL
Qty: 0 | Refills: 0 | COMMUNITY

## 2017-06-23 RX ORDER — LOSARTAN POTASSIUM 100 MG/1
1 TABLET, FILM COATED ORAL
Qty: 0 | Refills: 0 | COMMUNITY

## 2017-06-23 RX ORDER — FLUTICASONE PROPIONATE 220 MCG
1 AEROSOL WITH ADAPTER (GRAM) INHALATION
Qty: 0 | Refills: 0 | COMMUNITY

## 2017-06-23 NOTE — H&P PST ADULT - LAB RESULTS AND INTERPRETATION
D/W Dr Mills patient will be admitted to ER for anemia 7.5/25.  Dr Aguilar made aware.  Spoke to patient and daughter Angelique.  They verbalize understanding.

## 2017-06-23 NOTE — H&P PST ADULT - FAMILY HISTORY
<<-----Click on this checkbox to enter Family History Family history of cancer in mother     Family history of heart disease, father 57 y/o     Mother  Still living? Unknown  Family history of cancer, Age at diagnosis: Age Unknown

## 2017-06-23 NOTE — H&P PST ADULT - HISTORY OF PRESENT ILLNESS
76 yo female with h/o hypertension, SOB on exertion, CCS 3 and recent c/o chest pain with minimal exertiion.  Patient had stress test that showed

## 2017-06-23 NOTE — H&P PST ADULT - LAST CARDIAC ANGIOGRAM/IMAGING
cath 2010 Normal cors.     Carotid duplex 3/28/17 left subclavian normal right subclavian not visualized. CT chest  4/11/17 thyroid nodule 1.5 cm nodule, right suclavian visualized and normal

## 2017-06-23 NOTE — ASU PATIENT PROFILE, ADULT - FALL HARM RISK
home safety instructions  with good understanding/bones(Osteoporosis,prev fx,steroid use,metastatic bone ca

## 2017-06-24 ENCOUNTER — INPATIENT (INPATIENT)
Facility: HOSPITAL | Age: 77
LOS: 7 days | Discharge: HOME | DRG: 812 | End: 2017-07-02
Attending: FAMILY MEDICINE | Admitting: HOSPITALIST
Payer: MEDICARE

## 2017-06-24 VITALS — WEIGHT: 263.01 LBS | HEIGHT: 68 IN

## 2017-06-24 DIAGNOSIS — Z98.89 OTHER SPECIFIED POSTPROCEDURAL STATES: Chronic | ICD-10-CM

## 2017-06-24 DIAGNOSIS — K21.9 GASTRO-ESOPHAGEAL REFLUX DISEASE WITHOUT ESOPHAGITIS: ICD-10-CM

## 2017-06-24 DIAGNOSIS — D64.9 ANEMIA, UNSPECIFIED: ICD-10-CM

## 2017-06-24 DIAGNOSIS — I10 ESSENTIAL (PRIMARY) HYPERTENSION: ICD-10-CM

## 2017-06-24 DIAGNOSIS — Z98.1 ARTHRODESIS STATUS: Chronic | ICD-10-CM

## 2017-06-24 DIAGNOSIS — R55 SYNCOPE AND COLLAPSE: ICD-10-CM

## 2017-06-24 DIAGNOSIS — I48.91 UNSPECIFIED ATRIAL FIBRILLATION: ICD-10-CM

## 2017-06-24 DIAGNOSIS — E11.9 TYPE 2 DIABETES MELLITUS WITHOUT COMPLICATIONS: ICD-10-CM

## 2017-06-24 DIAGNOSIS — I82.409 ACUTE EMBOLISM AND THROMBOSIS OF UNSPECIFIED DEEP VEINS OF UNSPECIFIED LOWER EXTREMITY: ICD-10-CM

## 2017-06-24 DIAGNOSIS — Z90.710 ACQUIRED ABSENCE OF BOTH CERVIX AND UTERUS: Chronic | ICD-10-CM

## 2017-06-24 LAB
ALBUMIN SERPL ELPH-MCNC: 4 G/DL — SIGNIFICANT CHANGE UP (ref 3.3–5.2)
ALP SERPL-CCNC: 101 U/L — SIGNIFICANT CHANGE UP (ref 40–120)
ALT FLD-CCNC: 8 U/L — SIGNIFICANT CHANGE UP
ANION GAP SERPL CALC-SCNC: 15 MMOL/L — SIGNIFICANT CHANGE UP (ref 5–17)
ANISOCYTOSIS BLD QL: SLIGHT — SIGNIFICANT CHANGE UP
AST SERPL-CCNC: 15 U/L — SIGNIFICANT CHANGE UP
BASOPHILS # BLD AUTO: 0 K/UL — SIGNIFICANT CHANGE UP (ref 0–0.2)
BASOPHILS NFR BLD AUTO: 1 % — SIGNIFICANT CHANGE UP (ref 0–2)
BILIRUB SERPL-MCNC: 0.3 MG/DL — LOW (ref 0.4–2)
BLD GP AB SCN SERPL QL: SIGNIFICANT CHANGE UP
BUN SERPL-MCNC: 15 MG/DL — SIGNIFICANT CHANGE UP (ref 8–20)
CALCIUM SERPL-MCNC: 8.3 MG/DL — LOW (ref 8.6–10.2)
CHLORIDE SERPL-SCNC: 104 MMOL/L — SIGNIFICANT CHANGE UP (ref 98–107)
CK SERPL-CCNC: 68 U/L — SIGNIFICANT CHANGE UP (ref 25–170)
CO2 SERPL-SCNC: 21 MMOL/L — LOW (ref 22–29)
CREAT SERPL-MCNC: 1.06 MG/DL — SIGNIFICANT CHANGE UP (ref 0.5–1.3)
DACRYOCYTES BLD QL SMEAR: SLIGHT — SIGNIFICANT CHANGE UP
ELLIPTOCYTES BLD QL SMEAR: SLIGHT — SIGNIFICANT CHANGE UP
EOSINOPHIL # BLD AUTO: 0.1 K/UL — SIGNIFICANT CHANGE UP (ref 0–0.5)
EOSINOPHIL NFR BLD AUTO: 3 % — SIGNIFICANT CHANGE UP (ref 0–5)
GLUCOSE SERPL-MCNC: 97 MG/DL — SIGNIFICANT CHANGE UP (ref 70–115)
HCT VFR BLD CALC: 23.4 % — LOW (ref 37–47)
HGB BLD-MCNC: 7 G/DL — CRITICAL LOW (ref 12–16)
HYPOCHROMIA BLD QL: SLIGHT — SIGNIFICANT CHANGE UP
INR BLD: 1.91 RATIO — HIGH (ref 0.88–1.16)
LYMPHOCYTES # BLD AUTO: 1.1 K/UL — SIGNIFICANT CHANGE UP (ref 1–4.8)
LYMPHOCYTES # BLD AUTO: 35 % — SIGNIFICANT CHANGE UP (ref 20–55)
MACROCYTES BLD QL: SLIGHT — SIGNIFICANT CHANGE UP
MCHC RBC-ENTMCNC: 23.5 PG — LOW (ref 27–31)
MCHC RBC-ENTMCNC: 29.9 G/DL — LOW (ref 32–36)
MCV RBC AUTO: 78.5 FL — LOW (ref 81–99)
MICROCYTES BLD QL: SLIGHT — SIGNIFICANT CHANGE UP
MONOCYTES # BLD AUTO: 0.4 K/UL — SIGNIFICANT CHANGE UP (ref 0–0.8)
MONOCYTES NFR BLD AUTO: 13 % — HIGH (ref 3–10)
NEUTROPHILS # BLD AUTO: 1.2 K/UL — LOW (ref 1.8–8)
NEUTROPHILS NFR BLD AUTO: 43 % — SIGNIFICANT CHANGE UP (ref 37–73)
NEUTS BAND # BLD: 2 % — SIGNIFICANT CHANGE UP (ref 0–8)
NT-PROBNP SERPL-SCNC: 192 PG/ML — SIGNIFICANT CHANGE UP (ref 0–300)
OB PNL STL: NEGATIVE — SIGNIFICANT CHANGE UP
OVALOCYTES BLD QL SMEAR: SLIGHT — SIGNIFICANT CHANGE UP
PLAT MORPH BLD: NORMAL — SIGNIFICANT CHANGE UP
PLATELET # BLD AUTO: 210 K/UL — SIGNIFICANT CHANGE UP (ref 150–400)
POIKILOCYTOSIS BLD QL AUTO: SLIGHT — SIGNIFICANT CHANGE UP
POTASSIUM SERPL-MCNC: 4 MMOL/L — SIGNIFICANT CHANGE UP (ref 3.5–5.3)
POTASSIUM SERPL-SCNC: 4 MMOL/L — SIGNIFICANT CHANGE UP (ref 3.5–5.3)
PROT SERPL-MCNC: 7.1 G/DL — SIGNIFICANT CHANGE UP (ref 6.6–8.7)
PROTHROM AB SERPL-ACNC: 21.3 SEC — HIGH (ref 9.8–12.7)
RBC # BLD: 2.98 M/UL — LOW (ref 4.4–5.2)
RBC # FLD: 15.9 % — HIGH (ref 11–15.6)
RBC BLD AUTO: ABNORMAL
SODIUM SERPL-SCNC: 140 MMOL/L — SIGNIFICANT CHANGE UP (ref 135–145)
TROPONIN T SERPL-MCNC: <0.01 NG/ML — SIGNIFICANT CHANGE UP (ref 0–0.06)
TYPE + AB SCN PNL BLD: SIGNIFICANT CHANGE UP
VARIANT LYMPHS # BLD: 3 % — SIGNIFICANT CHANGE UP (ref 0–6)
WBC # BLD: 2.7 K/UL — LOW (ref 4.8–10.8)
WBC # FLD AUTO: 2.7 K/UL — LOW (ref 4.8–10.8)

## 2017-06-24 PROCEDURE — 99223 1ST HOSP IP/OBS HIGH 75: CPT | Mod: AI

## 2017-06-24 PROCEDURE — 71010: CPT | Mod: 26

## 2017-06-24 PROCEDURE — 99285 EMERGENCY DEPT VISIT HI MDM: CPT

## 2017-06-24 PROCEDURE — 93010 ELECTROCARDIOGRAM REPORT: CPT

## 2017-06-24 RX ORDER — LOSARTAN POTASSIUM 100 MG/1
100 TABLET, FILM COATED ORAL DAILY
Qty: 0 | Refills: 0 | Status: DISCONTINUED | OUTPATIENT
Start: 2017-06-24 | End: 2017-07-02

## 2017-06-24 RX ORDER — AMIODARONE HYDROCHLORIDE 400 MG/1
100 TABLET ORAL DAILY
Qty: 0 | Refills: 0 | Status: DISCONTINUED | OUTPATIENT
Start: 2017-06-24 | End: 2017-07-02

## 2017-06-24 RX ORDER — ATORVASTATIN CALCIUM 80 MG/1
40 TABLET, FILM COATED ORAL DAILY
Qty: 0 | Refills: 0 | Status: DISCONTINUED | OUTPATIENT
Start: 2017-06-24 | End: 2017-06-24

## 2017-06-24 RX ORDER — AMIODARONE HYDROCHLORIDE 400 MG/1
100 TABLET ORAL DAILY
Qty: 0 | Refills: 0 | Status: DISCONTINUED | OUTPATIENT
Start: 2017-06-24 | End: 2017-06-24

## 2017-06-24 RX ORDER — PANTOPRAZOLE SODIUM 20 MG/1
40 TABLET, DELAYED RELEASE ORAL
Qty: 0 | Refills: 0 | Status: DISCONTINUED | OUTPATIENT
Start: 2017-06-24 | End: 2017-07-02

## 2017-06-24 RX ORDER — ATORVASTATIN CALCIUM 80 MG/1
40 TABLET, FILM COATED ORAL AT BEDTIME
Qty: 0 | Refills: 0 | Status: DISCONTINUED | OUTPATIENT
Start: 2017-06-24 | End: 2017-07-02

## 2017-06-24 RX ORDER — DILTIAZEM HCL 120 MG
360 CAPSULE, EXT RELEASE 24 HR ORAL DAILY
Qty: 0 | Refills: 0 | Status: DISCONTINUED | OUTPATIENT
Start: 2017-06-24 | End: 2017-07-02

## 2017-06-24 RX ORDER — ALPRAZOLAM 0.25 MG
1 TABLET ORAL
Qty: 0 | Refills: 0 | COMMUNITY

## 2017-06-24 RX ADMIN — ATORVASTATIN CALCIUM 40 MILLIGRAM(S): 80 TABLET, FILM COATED ORAL at 22:01

## 2017-06-24 NOTE — H&P ADULT - PROBLEM SELECTOR PLAN 1
unknown etiology  has hx of GAVE, and iron def anemia  1 unit PRBC ordered, repeat cbc in am   monitor for hemodynamic instability  check iron, tibc, ferritin levels

## 2017-06-24 NOTE — H&P ADULT - HISTORY OF PRESENT ILLNESS
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     Pt had recent stress test 3/30/17  done which showed mild-mod defect to mid to distal anterior wall. Pt was scheduled for cardiac catherization this 6/27/17 and had pre surgical testing done. At pre surgical testing pt's labs were drawn. Pt recieved a call to come to ED for low hemoglobin. Coumadin was on hold. Pt took one dose on 6/23/17 of 5mg.     Pt states that she had been fatigued, sob on minimal exertion.

## 2017-06-24 NOTE — H&P ADULT - PROBLEM SELECTOR PLAN 3
on coumadin  check inr/pt  cw cardizem  cardio consult on coumadin  check inr/pt  cw cardizem  known to dr spangler. pending cath on tuesday

## 2017-06-24 NOTE — ED ADULT NURSE REASSESSMENT NOTE - NS ED NURSE REASSESS COMMENT FT1
Assumed pt care. Pt is lying supine on gurney, breathing well on 3LPM via NC. NSR on cm. Hypertensive. Pt given 1 unit PRBCs via pump. Consent scanned and in chart. 20g IV in LAC. Pt afebrile. Tolerating well. Awaiting admission orders. Will closely monitor BP.

## 2017-06-24 NOTE — ED ADULT NURSE NOTE - CHIEF COMPLAINT QUOTE
patient came in yesterday for pre-surgical testing and was told Hemoglobin was 7.5. [facility] patient also c/o shortness of breath

## 2017-06-24 NOTE — H&P ADULT - NSHPREVIEWOFSYSTEMS_GEN_ALL_CORE
+sob  +fatigue  no palpitations  no chest pain   no melena  no hematochezia  no BRBPR  no hematuria  no epistaxis  no diarrhea  no constipation

## 2017-06-24 NOTE — ED ADULT NURSE REASSESSMENT NOTE - NS ED NURSE REASSESS COMMENT FT1
Odd number PA aware of pt's HTN, advised that pt h as already taken her amiodarone and cardizem this morning. Will transport to 5T asap.

## 2017-06-24 NOTE — ED ADULT TRIAGE NOTE - CHIEF COMPLAINT QUOTE
patient came in yesterday for pre-surgical testing and was told Hemoglobin was 7.5. [facility] patient also c/o shortness of breath

## 2017-06-24 NOTE — ED ADULT NURSE NOTE - OBJECTIVE STATEMENT
Pt went to [facility] yesterday for a Cardiac cath this week.  pt was called with a HGB of "7.5".  Pt states she has had a prior hx of abdominal bleeding and has needed transfusions.  Pts HR is Normal Sinus Rhythm on card monitor, lung clear b/l abd soft with positive bowel sounds in all four quadrants.  Pt denies rectal bleeding.

## 2017-06-24 NOTE — H&P ADULT - FAMILY HISTORY
<<-----Click on this checkbox to enter Family History Family history of cancer     Mother  Still living? Unknown  Family history of cancer in mother, Age at diagnosis: Age Unknown     Father  Still living? Unknown  Family history of heart disease, Age at diagnosis: Age Unknown

## 2017-06-24 NOTE — H&P ADULT - NSHPLABSRESULTS_GEN_ALL_CORE
LABS:    PT/INR - ( 24 Jun 2017 14:43 )   PT: 21.3 sec;   INR: 1.91 ratio         PTT - ( 23 Jun 2017 10:20 )  PTT:36.1 sec  LIVER FUNCTIONS - ( 24 Jun 2017 14:43 )  Alb: 4.0 g/dL / Pro: 7.1 g/dL / ALK PHOS: 101 U/L / ALT: 8 U/L / AST: 15 U/L / GGT: x                                 7.0    2.7   )-----------( 210      ( 24 Jun 2017 14:43 )             23.4     06-24    140  |  104  |  15.0  ----------------------------<  97  4.0   |  21.0<L>  |  1.06    Ca    8.3<L>      24 Jun 2017 14:43    TPro  7.1  /  Alb  4.0  /  TBili  0.3<L>  /  DBili  x   /  AST  15  /  ALT  8   /  AlkPhos  101  06-24    CARDIAC MARKERS ( 24 Jun 2017 14:43 )  x     / <0.01 ng/mL / 68 U/L / x     / x

## 2017-06-24 NOTE — ED PROVIDER NOTE - OBJECTIVE STATEMENT
76F with h/o GAVE syndrome, Afib and DVT on coumadin (no PE), fatigue, sent from presurgical testing for low H/H. Pt states she had a GI bleed in November and was hospitalized 3-4 days for transfusion/ monitoring; had an endoscopy in February and was found to have GAVE syndrome and a nonbleeding ulcer (Dr. Gant).  She denies any history at any time of BRBPR or dark, tarry stools.  Pt was getting presurgical testing for a cath after intermittent SOB and an abnormal nuclear stress test (Cards: stacey). Pt states that after she received notice of recurrent anemia last night, she decided NOT to hold her coumadin since sh epresumes she won't be getting cathed on Tuesday and she is so prone to DVT.  (Pt states bloodwork was last checked in March and was normal)

## 2017-06-24 NOTE — ED PROVIDER NOTE - MEDICAL DECISION MAKING DETAILS
76yoF pale appearing sent for low H/H and fatigue, h/o GAVE syndrome, still on coumadin. Plan to transfuse, admit for monitoring given current anticoagulation

## 2017-06-24 NOTE — H&P ADULT - NSHPPHYSICALEXAM_GEN_ALL_CORE
EXAM:  Vital Signs Last 24 Hrs  T(C): 36.8, Max: 36.8 (06-24 @ 20:04)  T(F): 98.3, Max: 98.3 (06-24 @ 20:04)  HR: 57 (57 - 70)  BP: 169/75 (152/72 - 207/80)  BP(mean): --  RR: 20 (15 - 20)  SpO2: 100% (98% - 100%)    GENERAL NAD,   HEENT: NORMAL CEPHALIC ATRAUMATIC, EOMI, MOIST MUCUS MEMBRANES  NECK SUPPLE  CVS S1S2, RRR,   RESP BLCTA, NO RHONCHI  ABD SOFT, NON TENDER, NON DISTENDED  EXT NO EDEMA  NEURO MOVES ALL 4 EXTREMITES  PSYCH APPROPRIATE MOOD  SKIN WARM, DRY, +MULTIPLE SKIN TAGS  RECTAL AS PER ED, STOOL IS BROWN.

## 2017-06-25 LAB
ANION GAP SERPL CALC-SCNC: 13 MMOL/L — SIGNIFICANT CHANGE UP (ref 5–17)
BUN SERPL-MCNC: 11 MG/DL — SIGNIFICANT CHANGE UP (ref 8–20)
CALCIUM SERPL-MCNC: 8.3 MG/DL — LOW (ref 8.6–10.2)
CHLORIDE SERPL-SCNC: 106 MMOL/L — SIGNIFICANT CHANGE UP (ref 98–107)
CO2 SERPL-SCNC: 22 MMOL/L — SIGNIFICANT CHANGE UP (ref 22–29)
CREAT SERPL-MCNC: 0.98 MG/DL — SIGNIFICANT CHANGE UP (ref 0.5–1.3)
FERRITIN SERPL-MCNC: 7.6 NG/ML — LOW (ref 11–306)
GLUCOSE SERPL-MCNC: 100 MG/DL — SIGNIFICANT CHANGE UP (ref 70–115)
HCT VFR BLD CALC: 26.2 % — LOW (ref 37–47)
HGB BLD-MCNC: 7.8 G/DL — LOW (ref 12–16)
INR BLD: 1.71 RATIO — HIGH (ref 0.88–1.16)
IRON SATN MFR SERPL: 30 UG/DL — LOW (ref 37–145)
IRON SATN MFR SERPL: 7 % — LOW (ref 14–50)
MCHC RBC-ENTMCNC: 24.1 PG — LOW (ref 27–31)
MCHC RBC-ENTMCNC: 29.8 G/DL — LOW (ref 32–36)
MCV RBC AUTO: 80.9 FL — LOW (ref 81–99)
PLATELET # BLD AUTO: 185 K/UL — SIGNIFICANT CHANGE UP (ref 150–400)
POTASSIUM SERPL-MCNC: 3.9 MMOL/L — SIGNIFICANT CHANGE UP (ref 3.5–5.3)
POTASSIUM SERPL-SCNC: 3.9 MMOL/L — SIGNIFICANT CHANGE UP (ref 3.5–5.3)
PROTHROM AB SERPL-ACNC: 19 SEC — HIGH (ref 9.8–12.7)
RBC # BLD: 3.24 M/UL — LOW (ref 4.4–5.2)
RBC # BLD: 3.24 M/UL — LOW (ref 4.4–5.2)
RBC # FLD: 15.8 % — HIGH (ref 11–15.6)
RETICS #: 51.5 K/UL — SIGNIFICANT CHANGE UP (ref 25–125)
RETICS/RBC NFR: 1.6 % — SIGNIFICANT CHANGE UP (ref 0.5–2.6)
SODIUM SERPL-SCNC: 141 MMOL/L — SIGNIFICANT CHANGE UP (ref 135–145)
TIBC SERPL-MCNC: 406 UG/DL — SIGNIFICANT CHANGE UP (ref 220–430)
TRANSFERRIN SERPL-MCNC: 284 MG/DL — SIGNIFICANT CHANGE UP (ref 192–382)
WBC # BLD: 3 K/UL — LOW (ref 4.8–10.8)
WBC # FLD AUTO: 3 K/UL — LOW (ref 4.8–10.8)

## 2017-06-25 RX ADMIN — PANTOPRAZOLE SODIUM 40 MILLIGRAM(S): 20 TABLET, DELAYED RELEASE ORAL at 05:34

## 2017-06-25 RX ADMIN — AMIODARONE HYDROCHLORIDE 100 MILLIGRAM(S): 400 TABLET ORAL at 05:34

## 2017-06-25 RX ADMIN — LOSARTAN POTASSIUM 100 MILLIGRAM(S): 100 TABLET, FILM COATED ORAL at 05:34

## 2017-06-25 RX ADMIN — ATORVASTATIN CALCIUM 40 MILLIGRAM(S): 80 TABLET, FILM COATED ORAL at 21:57

## 2017-06-25 RX ADMIN — Medication 360 MILLIGRAM(S): at 05:34

## 2017-06-25 NOTE — PROGRESS NOTE ADULT - SUBJECTIVE AND OBJECTIVE BOX
SUBJECTIVE    REVIEW OF SYSTEMS    General: Not in any pain	    Skin/Breast: No rash  	  ENMT: No visual problems, no sore throat	    Respiratory and Thorax: No cough, No CP, No SOB  	  Cardiovascular: No CP, No palpitations    Gastrointestinal: No Abd pain, No N/V/D    Musculoskeletal: No Joint pain, No back pain	    Neurological: No headache    Psychiatric: No anxiety      OBJECTIVE    Vital Signs Last 24 Hrs  T(C): 36.3, Max: 36.9 (06-24 @ 20:46)  T(F): 97.4, Max: 98.5 (06-24 @ 20:46)  HR: 62 (57 - 70)  BP: 138/80 (138/80 - 207/80)  BP(mean): --  RR: 18 (15 - 20)  SpO2: 100% (100% - 100%)    PHYSICAL EXAM:    Constitutional: Not in any distress, pale    Eyes: No conjunctival injection    ENMT: No oral lesions    Neck: No nodes, no adenopathy    Back: Straight, no defects    Respiratory: clear b/l    Cardiovascular: RRR, no murmur    Gastrointestinal: soft, NT, ND    Extremities: No edema, no erythema    Neurological: no focal deficit    Skin: No rash      MEDICATIONS  (STANDING):  diltiazem   CD 360milliGRAM(s) Oral daily  losartan 100milliGRAM(s) Oral daily  pantoprazole    Tablet 40milliGRAM(s) Oral before breakfast  amiodarone    Tablet 100milliGRAM(s) Oral daily  atorvastatin 40milliGRAM(s) Oral at bedtime    MEDICATIONS  (PRN):    CARDIAC MARKERS ( 24 Jun 2017 14:43 )  x     / <0.01 ng/mL / 68 U/L / x     / x                                7.8    3.0   )-----------( 185      ( 25 Jun 2017 05:43 )             26.2     25 Jun 2017 05:43    141    |  106    |  11.0   ----------------------------<  100    3.9     |  22.0   |  0.98     Ca    8.3        25 Jun 2017 05:43    TPro  7.1    /  Alb  4.0    /  TBili  0.3    /  DBili  x      /  AST  15     /  ALT  8      /  AlkPhos  101    24 Jun 2017 14:43    Allergies    clonidine (Unknown)  Darvocet-N 100 (Unknown)  enalapril (Unknown)  hydrochlorothiazide (Unknown)  Keflex (Unknown)  Lyrica (Unknown)  metoprolol (Unknown)  penicillin (Unknown)  sulfa drugs (Unknown)  tetracycline (Unknown)    Intolerances

## 2017-06-25 NOTE — PROGRESS NOTE ADULT - PROBLEM SELECTOR PLAN 1
stool neg for blood, pt was scheduled for outpt colonoscopy (Alfreda); will ask for GI eval; Pt sees Dr Watson for Heme, will ask for consult

## 2017-06-26 DIAGNOSIS — D64.9 ANEMIA, UNSPECIFIED: ICD-10-CM

## 2017-06-26 DIAGNOSIS — K31.819 ANGIODYSPLASIA OF STOMACH AND DUODENUM WITHOUT BLEEDING: ICD-10-CM

## 2017-06-26 DIAGNOSIS — K21.9 GASTRO-ESOPHAGEAL REFLUX DISEASE WITHOUT ESOPHAGITIS: ICD-10-CM

## 2017-06-26 DIAGNOSIS — D50.9 IRON DEFICIENCY ANEMIA, UNSPECIFIED: ICD-10-CM

## 2017-06-26 LAB
BLD GP AB SCN SERPL QL: SIGNIFICANT CHANGE UP
HCT VFR BLD CALC: 26.7 % — LOW (ref 37–47)
HGB BLD-MCNC: 7.9 G/DL — LOW (ref 12–16)
INR BLD: 1.45 RATIO — HIGH (ref 0.88–1.16)
MCHC RBC-ENTMCNC: 24.2 PG — LOW (ref 27–31)
MCHC RBC-ENTMCNC: 29.6 G/DL — LOW (ref 32–36)
MCV RBC AUTO: 81.7 FL — SIGNIFICANT CHANGE UP (ref 81–99)
PLATELET # BLD AUTO: 217 K/UL — SIGNIFICANT CHANGE UP (ref 150–400)
PROTHROM AB SERPL-ACNC: 16.1 SEC — HIGH (ref 9.8–12.7)
RBC # BLD: 3.27 M/UL — LOW (ref 4.4–5.2)
RBC # FLD: 15.9 % — HIGH (ref 11–15.6)
TYPE + AB SCN PNL BLD: SIGNIFICANT CHANGE UP
WBC # BLD: 2.5 K/UL — LOW (ref 4.8–10.8)
WBC # FLD AUTO: 2.5 K/UL — LOW (ref 4.8–10.8)

## 2017-06-26 PROCEDURE — 99222 1ST HOSP IP/OBS MODERATE 55: CPT

## 2017-06-26 RX ADMIN — ATORVASTATIN CALCIUM 40 MILLIGRAM(S): 80 TABLET, FILM COATED ORAL at 22:33

## 2017-06-26 RX ADMIN — AMIODARONE HYDROCHLORIDE 100 MILLIGRAM(S): 400 TABLET ORAL at 05:15

## 2017-06-26 RX ADMIN — PANTOPRAZOLE SODIUM 40 MILLIGRAM(S): 20 TABLET, DELAYED RELEASE ORAL at 05:15

## 2017-06-26 RX ADMIN — LOSARTAN POTASSIUM 100 MILLIGRAM(S): 100 TABLET, FILM COATED ORAL at 05:14

## 2017-06-26 RX ADMIN — Medication 360 MILLIGRAM(S): at 05:14

## 2017-06-26 NOTE — CONSULT NOTE ADULT - PROBLEM SELECTOR RECOMMENDATION 9
- recommend continuing on current dose of amiodarone 100 mg q.d. , diltiazem 240 mg b.i.d., hold coumadin given low H/H , ILR interrogation, keep K>4 mg >2  obtain TTE results from office  - magnesium, phosphorus  Cardiac cath on hold for now - recommend continuing on current dose of amiodarone 100 mg q.d. , diltiazem 240 mg b.i.d., hold coumadin given low H/H , ILR interrogation, keep K>4 mg >2  obtain TTE results from office  - magnesium, phosphorus  Cardiac cath on hold for now  will discuss timing of cath with Dr Aguilar and Dr Sr

## 2017-06-26 NOTE — CONSULT NOTE ADULT - ASSESSMENT
Chronic MATTHEW.  Prior GI evaluations reveal GAVE, only.  No active PUD.  Negative colonoscopy exams in past.  No active GI bleeding now.  Stool for OB neg x2.  Agree with heme eval for transfusion to hgb>9 and outpt GI follow up. (Alfreda).
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 Patient also states she had very high blood pressure when she was admitted, but there's no record of high blood pressures.  Rule out cardiogenic cause of pre-syncope.

## 2017-06-26 NOTE — PROGRESS NOTE ADULT - SUBJECTIVE AND OBJECTIVE BOX
SUBJECTIVE    REVIEW OF SYSTEMS    General: Not in any pain	    Skin/Breast: No rash  	  ENMT: No visual problems, no sore throat	    Respiratory and Thorax: No cough, No CP, No SOB  	  Cardiovascular: No CP, No palpitations    Gastrointestinal: No Abd pain, No N/V/D    Musculoskeletal: No Joint pain, No back pain	    Neurological: No headache    Psychiatric: No anxiety      OBJECTIVE    Vital Signs Last 24 Hrs  T(C): 36.6, Max: 36.9 (06-25 @ 22:11)  T(F): 97.9, Max: 98.5 (06-25 @ 22:11)  HR: 69 (63 - 75)  BP: 169/81 (129/74 - 169/81)  BP(mean): --  RR: 20 (18 - 20)  SpO2: 95% (95% - 100%)    PHYSICAL EXAM:    Constitutional: Not in any distress    Eyes: No conjunctival injection    ENMT: No oral lesions    Neck: No nodes, no adenopathy    Back: Straight, no defects    Respiratory: clear b/l    Cardiovascular: RRR, no murmur    Gastrointestinal: soft, NT, ND    Extremities: No edema, no erythema    Neurological: no focal deficit    Skin: No rash      MEDICATIONS  (STANDING):  diltiazem   CD 360milliGRAM(s) Oral daily  losartan 100milliGRAM(s) Oral daily  pantoprazole    Tablet 40milliGRAM(s) Oral before breakfast  amiodarone    Tablet 100milliGRAM(s) Oral daily  atorvastatin 40milliGRAM(s) Oral at bedtime    MEDICATIONS  (PRN):                              7.9    2.5   )-----------( 217      ( 26 Jun 2017 06:11 )             26.7     25 Jun 2017 05:43    141    |  106    |  11.0   ----------------------------<  100    3.9     |  22.0   |  0.98     Ca    8.3        25 Jun 2017 05:43      Allergies    clonidine (Unknown)  Darvocet-N 100 (Unknown)  enalapril (Unknown)  hydrochlorothiazide (Unknown)  Keflex (Unknown)  Lyrica (Unknown)  metoprolol (Unknown)  penicillin (Unknown)  sulfa drugs (Unknown)  tetracycline (Unknown)    Intolerances

## 2017-06-26 NOTE — CONSULT NOTE ADULT - SUBJECTIVE AND OBJECTIVE BOX
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   Pt presenting with abnormal labs, severe anemia.  Had been planned for Cardic cath to investigate angina and abnormal stress testing.       Pt states that she had been fatigued, sob on minimal exertion. No recent transfusions.  Transfused 1 unit of prbc's on this admission.    Prior attempt at CT was unsuccessful by Dr Ashraf 3 yrs ago,  VC allegedly negative. Distant colonoscopy was done by Dr Gant.  Recent EGD in 2/2017 was + for GAVE.  No intervention was done.    Pt specifically denies overt bleeding even with chronic anticoagulation.  Alleges to have a distant hx of PUD, no HP.  Has been on PPI medication x yrs and compliant with same.  Denies abdominal pain, dyspepsia, heartburn or dysphagia.  No diarrhea or constipation.  No weight loss.  Stool for OB here was negative.         PAST MEDICAL & SURGICAL HISTORY:  Sleep apnea  DVT (deep venous thrombosis)  Arthritis  Angina pectoris  Atrial fibrillation  Fibromyalgia  Diabetes  GERD (gastroesophageal reflux disease)  HTN (hypertension)  S/P ankle arthrodesis  S/P tonsillectomy  S/P appendectomy  S/P hemorrhoidectomy  S/P hysterectomy      ROS:  No Heartburn, regurgitation, dysphagia, odynophagia.  No dyspepsia  No abdominal pain.    No Nausea, vomiting.  No Bleeding.  No hematemesis.   No diarrhea.    No hematochesia.  No weight loss, anorexia.  No edema.      MEDICATIONS  (STANDING):  diltiazem   CD 360milliGRAM(s) Oral daily  losartan 100milliGRAM(s) Oral daily  pantoprazole    Tablet 40milliGRAM(s) Oral before breakfast  amiodarone    Tablet 100milliGRAM(s) Oral daily  atorvastatin 40milliGRAM(s) Oral at bedtime    MEDICATIONS  (PRN):      Allergies    clonidine (Unknown)  Darvocet-N 100 (Unknown)  enalapril (Unknown)  hydrochlorothiazide (Unknown)  Keflex (Unknown)  Lyrica (Unknown)  metoprolol (Unknown)  penicillin (Unknown)  sulfa drugs (Unknown)  tetracycline (Unknown)    SOCIAL HISTORY:  No Tob, ETOH or IVDU.      FAMILY HISTORY:  Family history of cancer  Family history of heart disease (Father): father 57 y/o  Family history of cancer in mother (Mother)      Vital Signs Last 24 Hrs  T(C): 36.6, Max: 36.9 (06-25 @ 22:11)  T(F): 97.9, Max: 98.5 (06-25 @ 22:11)  HR: 69 (63 - 75)  BP: 169/81 (129/74 - 169/81)  BP(mean): --  RR: 20 (18 - 20)  SpO2: 95% (95% - 100%)    PHYSICAL EXAM:    GENERAL: NAD, well-groomed, well-developed  HEAD:  Atraumatic, Normocephalic  EYES: EOMI, PERRLA, conjunctiva and sclera clear  ENMT: No tonsillar erythema, exudates, or enlargement; Moist mucous membranes, Good dentition, No lesions  NECK: Supple, No JVD, Normal thyroid  CHEST/LUNG: Clear to percussion bilaterally; No rales, rhonchi, wheezing, or rubs  HEART: Regular rate and rhythm; No murmurs, rubs, or gallops  ABDOMEN: Soft, Nontender, Nondistended; Bowel sounds present.  old scars.  no hsm.  No MTR.  RAMY:  no lesions, no hemorrhoids.  Stool brown and OB negative.   EXTREMITIES:  2+ Peripheral Pulses, No clubbing, cyanosis, or edema  LYMPH: No lymphadenopathy noted  SKIN: No rashes or lesions      LABS:                        7.9    2.5   )-----------( 217      ( 26 Jun 2017 06:11 )             26.7     06-25    141  |  106  |  11.0  ----------------------------<  100  3.9   |  22.0  |  0.98    Ca    8.3<L>      25 Jun 2017 05:43      PT/INR - ( 26 Jun 2017 06:11 )   PT: 16.1 sec;   INR: 1.45 ratio         Fe: 30%;  % sat: 7,  Ferritin: 7.6,  all low.              RADIOLOGY & ADDITIONAL STUDIES:
Patient is 76  year old female well known to our practice with previously documented iron deficiency anemia, most likely from chronic A-V malformation bleeding, along with AOCD. Unable to tolerate oral iron but responding to IV iron in our office. Last seen 2/2017 with Hgb of 10.8 with normal MCV. Patient has not kept appoints since then and now presents with increasing SOB, microcytic/ hypochromic anemia with marked iron deficiency. Prior GI evaluations negative. PMH includes AF DM and sleep apnea. On multiple meds including warfarin. PE: pale obese female NAD, lungs: clear Heart AF marked pedal edema. Imp: recurrent iron deficiency superimposed on anemia of chronic disease (AOCD). Suggest: Transfuse with PRBC to obtain Hgb > 9.0 and have patient follow up in our office for IV iron . Thank you
Patient is 76 year old female with past medical history of chronic atrial fibrillation, multiple DVTs on coumadin, GAVE,  rheumatic fever 50 years ago, gastric ectasia, diabetes, hypertension, hyperlipidemia with recurrent admissions for dizziness and palpitations s/p ILR monitor now admitted with profound anemia.   Pt had recent stress test 3/30/17  done which showed mild-mod defect to mid to distal anterior wall. Pt was scheduled for cardiac catheterization this 6/27/17 and had pre surgical testing done. At pre surgical testing pt's labs were drawn. Pt received a call to come to ED for low hemoglobin. Coumadin was on hold. Pt took one dose on 6/23/17 of 5mg. Coumadin on hold.    ROS: patient denies syncope orthopnea PND Chest pain      Pt states that she had been fatigued, sob on minimal exertion.    All systems reviewed otherwise negative except as above      PAST MEDICAL & SURGICAL HISTORY:  Sleep apnea  DVT (deep venous thrombosis)  Arthritis  Angina pectoris  Atrial fibrillation  Fibromyalgia  Diabetes  GERD (gastroesophageal reflux disease)  HTN (hypertension)  S/P ankle arthrodesis  S/P tonsillectomy  S/P appendectomy  S/P hemorrhoidectomy  S/P hysterectomy      PREVIOUS DIAGNOSTIC TESTING:      ECHO  FINDINGS:    8/2015     IMPRESSION:  Summary:   1. Mildly enlarged left atrium.   2. Normal left ventricular internal cavity size. Mild septal left   ventricular hypertrophy as seen in hypertension.   3. Normal wall motion. Left ventricular ejection fraction, by visual   estimation, is 60 to 65%.   4. The right atrium is normal in size.   5. Normal right ventricular size and function.   6. No significant valvular abnormality.   7. There is no evidence of pericardial effusion.          Allergies    clonidine (Unknown)  Darvocet-N 100 (Unknown)  enalapril (Unknown)  hydrochlorothiazide (Unknown)  Keflex (Unknown)  Lyrica (Unknown)  metoprolol (Unknown)  penicillin (Unknown)  sulfa drugs (Unknown)  tetracycline (Unknown)    Intolerances        MEDICATIONS  (STANDING):  diltiazem   CD 360milliGRAM(s) Oral daily  losartan 100milliGRAM(s) Oral daily  pantoprazole    Tablet 40milliGRAM(s) Oral before breakfast  amiodarone    Tablet 100milliGRAM(s) Oral daily  atorvastatin 40milliGRAM(s) Oral at bedtime    MEDICATIONS  (PRN):      FAMILY HISTORY:  Family history of cancer  Family history of heart disease (Father): father 59 y/o  Family history of cancer in mother (Mother)      SOCIAL HISTORY: Lives with family    CIGARETTES:none    ALCOHOL:none    REVIEW OF SYSTEMS:  CONSTITUTIONAL: No fever, weight loss, or fatigue  EYES: No eye pain, visual disturbances, or discharge  ENMT:  No difficulty hearing, tinnitus, vertigo; No sinus or throat pain  NECK: No pain or stiffness  RESPIRATORY: No cough, wheezing, chills or hemoptysis; No Shortness of Breath  CARDIOVASCULAR: No chest pain, palpitations, passing out, dizziness, or leg swelling  GASTROINTESTINAL: No abdominal or epigastric pain. No nausea, vomiting, or hematemesis; No diarrhea or constipation. No melena or hematochezia.  GENITOURINARY: No dysuria, frequency, hematuria, or incontinence  NEUROLOGICAL: No headaches, memory loss, loss of strength, numbness, or tremors  SKIN: No itching, burning, rashes, or lesions   LYMPH Nodes: No enlarged glands  ENDOCRINE: No heat or cold intolerance; No hair loss  MUSCULOSKELETAL: No joint pain or swelling; No muscle, back, or extremity pain  PSYCHIATRIC: No depression, anxiety, mood swings, or difficulty sleeping  HEME/LYMPH: No easy bruising, or bleeding gums  ALLERY AND IMMUNOLOGIC: No hives or eczema	    Vital Signs Last 24 Hrs  T(C): 36.1, Max: 36.9 (06-25 @ 22:11)  T(F): 97, Max: 98.5 (06-25 @ 22:11)  HR: 75 (61 - 75)  BP: 138/72 (129/74 - 138/72)  BP(mean): --  RR: 18 (18 - 20)  SpO2: 95% (95% - 100%)    Daily     Daily     I&O's Detail    I & Os for current day (as of 26 Jun 2017 09:18)  =============================================  IN:    Oral Fluid: 100 ml    Total IN: 100 ml  ---------------------------------------------  OUT:    Total OUT: 0 ml  ---------------------------------------------  Total NET: 100 ml      PHYSICAL EXAM:  Appearance: Normal, well nourished	  HEENT:   Normal oral mucosa, PERRL, EOMI, sclera non-icteric	  Lymphatic: No cervical lymphadenopathy  Cardiovascular: Normal S1 S2, No JVD, No cardiac murmurs, No carotid bruits, No peripheral edema  Respiratory: Lungs clear to auscultation	  Psychiatry: A & O x 3, Mood & affect appropriate  Gastrointestinal:  Soft, Non-tender, + BS, no bruits	  Skin: No rashes, No ecchymoses, No cyanosis  Neurologic: Grossly non-focal with full strength in all four extremities  Extremities: Normal range of motion, No clubbing, cyanosis or edema  Vascular: Peripheral pulses palpable 2+ bilaterally      INTERPRETATION OF TELEMETRY:    ECG :AF    LABS:                        7.9    2.5   )-----------( 217      ( 26 Jun 2017 06:11 )             26.7     06-25    141  |  106  |  11.0  ----------------------------<  100  3.9   |  22.0  |  0.98    Ca    8.3<L>      25 Jun 2017 05:43    TPro  7.1  /  Alb  4.0  /  TBili  0.3<L>  /  DBili  x   /  AST  15  /  ALT  8   /  AlkPhos  101  06-24    CARDIAC MARKERS ( 24 Jun 2017 14:43 )  x     / <0.01 ng/mL / 68 U/L / x     / x          PT/INR - ( 26 Jun 2017 06:11 )   PT: 16.1 sec;   INR: 1.45 ratio             I&O's Summary    I & Os for current day (as of 26 Jun 2017 09:18)  =============================================  IN: 100 ml / OUT: 0 ml / NET: 100 ml    BNP  RADIOLOGY & ADDITIONAL STUDIES:    Chexray:    The lungs are clear.  The heart is normal.  The soft tissues and osseous   structures are unremarkable. There is a loop recorder overlying the left   lower hemithorax.    Impression:    Normal chest radiograph.

## 2017-06-27 LAB
HCT VFR BLD CALC: 33.1 % — LOW (ref 37–47)
HGB BLD-MCNC: 10.2 G/DL — LOW (ref 12–16)
MCHC RBC-ENTMCNC: 24.8 PG — LOW (ref 27–31)
MCHC RBC-ENTMCNC: 30.8 G/DL — LOW (ref 32–36)
MCV RBC AUTO: 80.3 FL — LOW (ref 81–99)
PLATELET # BLD AUTO: 244 K/UL — SIGNIFICANT CHANGE UP (ref 150–400)
RBC # BLD: 4.12 M/UL — LOW (ref 4.4–5.2)
RBC # FLD: 16.1 % — HIGH (ref 11–15.6)
WBC # BLD: 3.3 K/UL — LOW (ref 4.8–10.8)
WBC # FLD AUTO: 3.3 K/UL — LOW (ref 4.8–10.8)

## 2017-06-27 PROCEDURE — 99231 SBSQ HOSP IP/OBS SF/LOW 25: CPT

## 2017-06-27 RX ORDER — TRAMADOL HYDROCHLORIDE 50 MG/1
50 TABLET ORAL EVERY 6 HOURS
Qty: 0 | Refills: 0 | Status: DISCONTINUED | OUTPATIENT
Start: 2017-06-27 | End: 2017-07-02

## 2017-06-27 RX ORDER — ENOXAPARIN SODIUM 100 MG/ML
120 INJECTION SUBCUTANEOUS EVERY 12 HOURS
Qty: 0 | Refills: 0 | Status: DISCONTINUED | OUTPATIENT
Start: 2017-06-27 | End: 2017-06-27

## 2017-06-27 RX ADMIN — PANTOPRAZOLE SODIUM 40 MILLIGRAM(S): 20 TABLET, DELAYED RELEASE ORAL at 05:33

## 2017-06-27 RX ADMIN — AMIODARONE HYDROCHLORIDE 100 MILLIGRAM(S): 400 TABLET ORAL at 05:33

## 2017-06-27 RX ADMIN — Medication 360 MILLIGRAM(S): at 05:34

## 2017-06-27 RX ADMIN — ATORVASTATIN CALCIUM 40 MILLIGRAM(S): 80 TABLET, FILM COATED ORAL at 21:02

## 2017-06-27 RX ADMIN — LOSARTAN POTASSIUM 100 MILLIGRAM(S): 100 TABLET, FILM COATED ORAL at 05:33

## 2017-06-27 NOTE — PROGRESS NOTE ADULT - SUBJECTIVE AND OBJECTIVE BOX
Pt seen and examined f/u anemia  This morning she has no complaints. Denies abdominal pain, nausea or vomiting. No rectal bleed or melena.    REVIEW OF SYSTEMS:    CONSTITUTIONAL: No fever, weight loss, or fatigue  EYES: No eye pain, visual disturbances, or discharge  ENMT:  No difficulty hearing, tinnitus, vertigo; No sinus or throat pain  RESPIRATORY: No cough, wheezing, chills or hemoptysis; No shortness of breath  CARDIOVASCULAR: No chest pain, palpitations, dizziness, or leg swelling  GASTROINTESTINAL: No abdominal or epigastric pain. No nausea, vomiting, or hematemesis; No diarrhea or constipation. No melena or hematochezia.    MEDICATIONS:  MEDICATIONS  (STANDING):  diltiazem    milliGRAM(s) Oral daily  losartan 100 milliGRAM(s) Oral daily  pantoprazole    Tablet 40 milliGRAM(s) Oral before breakfast  amiodarone    Tablet 100 milliGRAM(s) Oral daily  atorvastatin 40 milliGRAM(s) Oral at bedtime    MEDICATIONS  (PRN):      Allergies    clonidine (Unknown)  Darvocet-N 100 (Unknown)  enalapril (Unknown)  hydrochlorothiazide (Unknown)  Keflex (Unknown)  Lyrica (Unknown)  metoprolol (Unknown)  penicillin (Unknown)  sulfa drugs (Unknown)  tetracycline (Unknown)    Intolerances        Vital Signs Last 24 Hrs  T(C): 36.7 (27 Jun 2017 05:55), Max: 36.8 (26 Jun 2017 22:44)  T(F): 98 (27 Jun 2017 05:55), Max: 98.2 (26 Jun 2017 22:44)  HR: 60 (27 Jun 2017 05:55) (60 - 69)  BP: 132/69 (27 Jun 2017 05:55) (131/67 - 169/81)  BP(mean): --  RR: 18 (27 Jun 2017 05:55) (18 - 20)  SpO2: 100% (27 Jun 2017 05:55) (100% - 100%)    06-26 @ 07:01  -  06-27 @ 07:00  --------------------------------------------------------  IN: 200 mL / OUT: 0 mL / NET: 200 mL        PHYSICAL EXAM:    General: Well developed; well nourished; in no acute distress  HEENT: MMM, conjunctiva and sclera clear  Gastrointestinal:Abdomen: Soft non-tender non-distended; Normal bowel sounds; No hepatosplenomegaly  Extremities: no cyanosis, clubbing or edema.  Skin: Warm and dry. No obvious rash    LABS:      CBC Full  -  ( 26 Jun 2017 06:11 )  WBC Count : 2.5 K/uL  Hemoglobin : 7.9 g/dL  Hematocrit : 26.7 %  Platelet Count - Automated : 217 K/uL  Mean Cell Volume : 81.7 fl  Mean Cell Hemoglobin : 24.2 pg  Mean Cell Hemoglobin Concentration : 29.6 g/dL  Auto Neutrophil # : x  Auto Lymphocyte # : x  Auto Monocyte # : x  Auto Eosinophil # : x  Auto Basophil # : x  Auto Neutrophil % : x  Auto Lymphocyte % : x  Auto Monocyte % : x  Auto Eosinophil % : x  Auto Basophil % : x          PT/INR - ( 26 Jun 2017 06:11 )   PT: 16.1 sec;   INR: 1.45 ratio

## 2017-06-27 NOTE — PROVIDER CONTACT NOTE (MEDICATION) - SITUATION
Patient educated on importance of Lovenox, verbal and written educational material provided. Patient continuing to refuse injection. Dr. Mills aware, will converse with patient tomorrow (6/28).

## 2017-06-27 NOTE — PROGRESS NOTE ADULT - SUBJECTIVE AND OBJECTIVE BOX
SUBJECTIVE    REVIEW OF SYSTEMS    General: Not in any pain, feeling well today	    Skin/Breast: No rash  	  ENMT: No visual problems, no sore throat	    Respiratory and Thorax: No cough, No CP, No SOB  	  Cardiovascular: No CP, No palpitations    Gastrointestinal: No Abd pain, No N/V/D    Musculoskeletal: No Joint pain, No back pain	    Neurological: No headache    Psychiatric: No anxiety      OBJECTIVE    Vital Signs Last 24 Hrs  T(C): 36.6 (06-27-17 @ 16:38), Max: 36.8 (06-26-17 @ 22:44)  T(F): 97.9 (06-27-17 @ 16:38), Max: 98.2 (06-26-17 @ 22:44)  HR: 60 (06-27-17 @ 16:38) (60 - 65)  BP: 144/72 (06-27-17 @ 16:38) (131/67 - 148/85)  BP(mean): --  RR: 20 (06-27-17 @ 16:38) (18 - 20)  SpO2: 100% (06-27-17 @ 05:55) (100% - 100%)    PHYSICAL EXAM:    Constitutional: Not in any distress    Eyes: No conjunctival injection    ENMT: No oral lesions    Neck: No nodes, no adenopathy    Back: Straight, no defects    Respiratory: clear b/l    Cardiovascular: RRR, no murmur    Gastrointestinal: soft, NT, ND    Extremities: No edema, no erythema    Neurological: no focal deficit    Skin: No rash      MEDICATIONS  (STANDING):  diltiazem    milliGRAM(s) Oral daily  losartan 100 milliGRAM(s) Oral daily  pantoprazole    Tablet 40 milliGRAM(s) Oral before breakfast  amiodarone    Tablet 100 milliGRAM(s) Oral daily  atorvastatin 40 milliGRAM(s) Oral at bedtime    MEDICATIONS  (PRN):  traMADol 50 milliGRAM(s) Oral every 6 hours PRN Moderate Pain (4 - 6)                              10.2   3.3   )-----------( 244      ( 27 Jun 2017 10:14 )             33.1           Allergies    clonidine (Unknown)  Darvocet-N 100 (Unknown)  enalapril (Unknown)  hydrochlorothiazide (Unknown)  Keflex (Unknown)  Lyrica (Unknown)  metoprolol (Unknown)  penicillin (Unknown)  sulfa drugs (Unknown)  tetracycline (Unknown)    Intolerances

## 2017-06-27 NOTE — PROGRESS NOTE ADULT - PROBLEM SELECTOR PLAN 1
multifactorial with heme negative stools at this time. As per Dr. Fierro note of yesterday- no Gi intervention required at this time. Suggest f/u by her regular gastroenterologist as oupt as well as hematologist. Will see only prn your request. multifactorial with heme negative stools at this time. As per Dr. Fierro note of yesterday- no Gi intervention required at this time. Suggest f/u by her regular gastroenterologist as oupt as well as hematologist. Will see only prn your request. Ok to anticoagulate if needed but continue PPI as outpatient.

## 2017-06-28 DIAGNOSIS — J40 BRONCHITIS, NOT SPECIFIED AS ACUTE OR CHRONIC: ICD-10-CM

## 2017-06-28 LAB
HCT VFR BLD CALC: 29.7 % — LOW (ref 37–47)
HGB BLD-MCNC: 8.9 G/DL — LOW (ref 12–16)
INR BLD: 1.18 RATIO — HIGH (ref 0.88–1.16)
MCHC RBC-ENTMCNC: 24.3 PG — LOW (ref 27–31)
MCHC RBC-ENTMCNC: 30 G/DL — LOW (ref 32–36)
MCV RBC AUTO: 81.1 FL — SIGNIFICANT CHANGE UP (ref 81–99)
PLATELET # BLD AUTO: 229 K/UL — SIGNIFICANT CHANGE UP (ref 150–400)
PROTHROM AB SERPL-ACNC: 13 SEC — HIGH (ref 9.8–12.7)
RBC # BLD: 3.66 M/UL — LOW (ref 4.4–5.2)
RBC # FLD: 16.2 % — HIGH (ref 11–15.6)
WBC # BLD: 3.8 K/UL — LOW (ref 4.8–10.8)
WBC # FLD AUTO: 3.8 K/UL — LOW (ref 4.8–10.8)

## 2017-06-28 RX ORDER — AZITHROMYCIN 500 MG/1
500 TABLET, FILM COATED ORAL ONCE
Qty: 0 | Refills: 0 | Status: COMPLETED | OUTPATIENT
Start: 2017-06-28 | End: 2017-06-28

## 2017-06-28 RX ORDER — AZITHROMYCIN 500 MG/1
TABLET, FILM COATED ORAL
Qty: 0 | Refills: 0 | Status: DISCONTINUED | OUTPATIENT
Start: 2017-06-28 | End: 2017-07-02

## 2017-06-28 RX ORDER — ALBUTEROL 90 UG/1
2.5 AEROSOL, METERED ORAL ONCE
Qty: 0 | Refills: 0 | Status: COMPLETED | OUTPATIENT
Start: 2017-06-28 | End: 2017-06-29

## 2017-06-28 RX ORDER — AZITHROMYCIN 500 MG/1
500 TABLET, FILM COATED ORAL EVERY 24 HOURS
Qty: 0 | Refills: 0 | Status: DISCONTINUED | OUTPATIENT
Start: 2017-06-29 | End: 2017-07-02

## 2017-06-28 RX ADMIN — TRAMADOL HYDROCHLORIDE 50 MILLIGRAM(S): 50 TABLET ORAL at 12:13

## 2017-06-28 RX ADMIN — LOSARTAN POTASSIUM 100 MILLIGRAM(S): 100 TABLET, FILM COATED ORAL at 05:38

## 2017-06-28 RX ADMIN — AZITHROMYCIN 255 MILLIGRAM(S): 500 TABLET, FILM COATED ORAL at 17:50

## 2017-06-28 RX ADMIN — PANTOPRAZOLE SODIUM 40 MILLIGRAM(S): 20 TABLET, DELAYED RELEASE ORAL at 05:39

## 2017-06-28 RX ADMIN — AMIODARONE HYDROCHLORIDE 100 MILLIGRAM(S): 400 TABLET ORAL at 05:38

## 2017-06-28 RX ADMIN — TRAMADOL HYDROCHLORIDE 50 MILLIGRAM(S): 50 TABLET ORAL at 12:44

## 2017-06-28 RX ADMIN — Medication 360 MILLIGRAM(S): at 05:38

## 2017-06-28 RX ADMIN — ATORVASTATIN CALCIUM 40 MILLIGRAM(S): 80 TABLET, FILM COATED ORAL at 21:01

## 2017-06-28 NOTE — PROGRESS NOTE ADULT - SUBJECTIVE AND OBJECTIVE BOX
SUBJECTIVE    REVIEW OF SYSTEMS    General: Not in any pain	    Skin/Breast: No rash  	  ENMT: No visual problems, no sore throat	    Respiratory and Thorax: + cough and congestion  	  Cardiovascular: No CP, No palpitations    Gastrointestinal: No Abd pain, No N/V/D    Musculoskeletal: No Joint pain, No back pain	    Neurological: No headache    Psychiatric: No anxiety      OBJECTIVE    Vital Signs Last 24 Hrs  T(C): 36.7 (06-28-17 @ 16:05), Max: 36.8 (06-28-17 @ 04:42)  T(F): 98 (06-28-17 @ 16:05), Max: 98.3 (06-28-17 @ 04:42)  HR: 68 (06-28-17 @ 16:05) (64 - 68)  BP: 176/82 (06-28-17 @ 16:05) (144/73 - 176/82)  BP(mean): --  RR: 18 (06-28-17 @ 16:05) (18 - 20)  SpO2: --    PHYSICAL EXAM:    Constitutional: Not in any distress    Eyes: No conjunctival injection    ENMT: No oral lesions    Neck: No nodes, no adenopathy    Back: Straight, no defects    Respiratory: mild right crackles    Cardiovascular: RRR, no murmur    Gastrointestinal: soft, NT, ND    Extremities: No edema, no erythema    Neurological: no focal deficit    Skin: No rash      MEDICATIONS  (STANDING):  diltiazem    milliGRAM(s) Oral daily  losartan 100 milliGRAM(s) Oral daily  pantoprazole    Tablet 40 milliGRAM(s) Oral before breakfast  amiodarone    Tablet 100 milliGRAM(s) Oral daily  atorvastatin 40 milliGRAM(s) Oral at bedtime  azithromycin  IVPB   IV Intermittent   ALBUTerol    0.083%. 2.5 milliGRAM(s) Nebulizer once    MEDICATIONS  (PRN):  traMADol 50 milliGRAM(s) Oral every 6 hours PRN Moderate Pain (4 - 6)                              8.9    3.8   )-----------( 229      ( 28 Jun 2017 06:03 )             29.7           Allergies    clonidine (Unknown)  Darvocet-N 100 (Unknown)  enalapril (Unknown)  hydrochlorothiazide (Unknown)  Keflex (Unknown)  Lyrica (Unknown)  metoprolol (Unknown)  penicillin (Unknown)  sulfa drugs (Unknown)  tetracycline (Unknown)    Intolerances

## 2017-06-29 ENCOUNTER — APPOINTMENT (OUTPATIENT)
Dept: CARDIOLOGY | Facility: CLINIC | Age: 77
End: 2017-06-29

## 2017-06-29 LAB
HCT VFR BLD CALC: 28.7 % — LOW (ref 37–47)
HGB BLD-MCNC: 8.8 G/DL — LOW (ref 12–16)
MCHC RBC-ENTMCNC: 24.5 PG — LOW (ref 27–31)
MCHC RBC-ENTMCNC: 30.7 G/DL — LOW (ref 32–36)
MCV RBC AUTO: 79.9 FL — LOW (ref 81–99)
PLATELET # BLD AUTO: 212 K/UL — SIGNIFICANT CHANGE UP (ref 150–400)
RBC # BLD: 3.59 M/UL — LOW (ref 4.4–5.2)
RBC # FLD: 16.5 % — HIGH (ref 11–15.6)
WBC # BLD: 3.1 K/UL — LOW (ref 4.8–10.8)
WBC # FLD AUTO: 3.1 K/UL — LOW (ref 4.8–10.8)

## 2017-06-29 PROCEDURE — 93454 CORONARY ARTERY ANGIO S&I: CPT | Mod: 26

## 2017-06-29 PROCEDURE — 99233 SBSQ HOSP IP/OBS HIGH 50: CPT

## 2017-06-29 RX ORDER — ALBUTEROL 90 UG/1
2.5 AEROSOL, METERED ORAL EVERY 4 HOURS
Qty: 0 | Refills: 0 | Status: DISCONTINUED | OUTPATIENT
Start: 2017-06-29 | End: 2017-07-02

## 2017-06-29 RX ORDER — ALBUTEROL 90 UG/1
1 AEROSOL, METERED ORAL EVERY 4 HOURS
Qty: 0 | Refills: 0 | Status: DISCONTINUED | OUTPATIENT
Start: 2017-06-29 | End: 2017-07-02

## 2017-06-29 RX ORDER — ALBUTEROL 90 UG/1
2.5 AEROSOL, METERED ORAL ONCE
Qty: 0 | Refills: 0 | Status: COMPLETED | OUTPATIENT
Start: 2017-06-29 | End: 2017-06-29

## 2017-06-29 RX ORDER — HYDRALAZINE HCL 50 MG
5 TABLET ORAL ONCE
Qty: 0 | Refills: 0 | Status: COMPLETED | OUTPATIENT
Start: 2017-06-29 | End: 2017-06-29

## 2017-06-29 RX ADMIN — Medication 5 MILLIGRAM(S): at 15:15

## 2017-06-29 RX ADMIN — ALBUTEROL 2.5 MILLIGRAM(S): 90 AEROSOL, METERED ORAL at 01:18

## 2017-06-29 RX ADMIN — ALBUTEROL 2.5 MILLIGRAM(S): 90 AEROSOL, METERED ORAL at 22:00

## 2017-06-29 RX ADMIN — ATORVASTATIN CALCIUM 40 MILLIGRAM(S): 80 TABLET, FILM COATED ORAL at 21:53

## 2017-06-29 RX ADMIN — PANTOPRAZOLE SODIUM 40 MILLIGRAM(S): 20 TABLET, DELAYED RELEASE ORAL at 05:10

## 2017-06-29 RX ADMIN — AMIODARONE HYDROCHLORIDE 100 MILLIGRAM(S): 400 TABLET ORAL at 05:10

## 2017-06-29 RX ADMIN — ALBUTEROL 2.5 MILLIGRAM(S): 90 AEROSOL, METERED ORAL at 01:06

## 2017-06-29 RX ADMIN — AZITHROMYCIN 255 MILLIGRAM(S): 500 TABLET, FILM COATED ORAL at 16:35

## 2017-06-29 RX ADMIN — Medication 360 MILLIGRAM(S): at 05:10

## 2017-06-29 RX ADMIN — LOSARTAN POTASSIUM 100 MILLIGRAM(S): 100 TABLET, FILM COATED ORAL at 05:10

## 2017-06-29 NOTE — PROGRESS NOTE ADULT - SUBJECTIVE AND OBJECTIVE BOX
s/p LHC which showed left dominant system with ostial diag mod disease, RCA small vessel non-obstructive; official report pending  RFA access with band in place  Denies any chest pain or discomfort    Vital Signs Last 24 Hrs  T(C): 36.7 (29 Jun 2017 08:30), Max: 36.7 (28 Jun 2017 16:05)  T(F): 98 (29 Jun 2017 08:30), Max: 98.1 (29 Jun 2017 05:13)  HR: 60 (29 Jun 2017 14:30) (60 - 68)  BP: 180/78 (29 Jun 2017 14:30) (130/71 - 180/78)  BP(mean): --  RR: 19 (29 Jun 2017 14:30) (18 - 20)  SpO2: 95% (29 Jun 2017 14:30) (95% - 100%)      Physical Exam:  Gen: Awake, alert, in no acute distress  Ext: right radial site without hematoma or bleed, + radial pulses  Neuro: A&OX3      A: Patient is 76 year old female with c/o exertional lightheadedness and pre-syncope with past medical history of chronic atrial fibrillation, 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 Pt now s/p LHC with non-obstructive CAD, pending official cath report    P: Medical management  pt hypertensive throughout and post procedure (160s-190s), hydralazine IV given in recovery room  remove radial band gradually; start one hour post procedure  site check in am

## 2017-06-29 NOTE — PROGRESS NOTE ADULT - ASSESSMENT
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 Patient also states she had very high blood pressure when she was admitted, but there's no record of high blood pressures.  .         1) Atrial fibrillation  2) Pre-syncope  EP input appreciated  Continued on  amiodarone 100 mg q.d. , diltiazem 240 mg b.i.d.,   coumadin was held  given low H/H , ILR interrogation, keep K>4 mg >2    Cardiac cath on hold for now    pt refusing lovenox/heparin, will consider restart coumadin if anemia stabilizes.   Hb 8.8 today, s/p 1 unit PRBC   will discuss timing of cath with Dr Aguilar and Dr Sr- recommend continuing on current dose of amiodarone 100 mg q.d. , diltiazem 240 mg b.i.d., hold coumadin given low H/H , ILR interrogation, keep K>4 mg >2  obtain TTE results from office    3) Anemia, unspecified type.  GI input noted. multifactorial with heme negative stools at this time.   no GI intervention required at this time  FU as oupt.  Ok'ed to anticoagulate.  d/w Dr. Sr: Pt to have C today  Cath lab called, for cath with Dr. Valencia today      4) HTN (hypertension)  Controlled      5) Bronchitis  On azithromycin

## 2017-06-29 NOTE — PROGRESS NOTE ADULT - SUBJECTIVE AND OBJECTIVE BOX
CARDIOLOGY PROGRESS NOTE   ([facility])                                                                                                        Subjective: denied CP, dyspnea orthopnea PND, palpitation  Admitted cough with scanty whitish phlegm     Vitals:  T(C): 36.7 (06-29-17 @ 08:30), Max: 36.7 (06-28-17 @ 16:05)  HR: 66 (06-29-17 @ 08:30) (63 - 68)  BP: 137/80 (06-29-17 @ 08:30) (130/71 - 176/82)  RR: 18 (06-29-17 @ 08:30) (18 - 20)  SpO2: 96% (06-29-17 @ 05:13) (96% - 100%)  Wt(kg): --  I&O's Summary    28 Jun 2017 07:01  -  29 Jun 2017 07:00  --------------------------------------------------------  IN: 655 mL / OUT: 0 mL / NET: 655 mL    29 Jun 2017 07:01  -  29 Jun 2017 09:42  --------------------------------------------------------  IN: 200 mL / OUT: 0 mL / NET: 200 mL          PHYSICAL EXAM:  Appearance: Normal	  HEENT:   Atraumatic  Cardiovascular: Normal S1 S2, No JVD, No edema  Respiratory:Prolonged expiratory phase  Gastrointestinal:  Soft, Non-tender, + BS	  Skin: No rashes, No ecchymoses, No cyanosis  Neurologic: Alert and awake, able to move extremities  Extremities: No edema    TELEMETRY: 	  SR      CURRENT MEDICATIONS:  diltiazem    milliGRAM(s) Oral daily  losartan 100 milliGRAM(s) Oral daily  amiodarone    Tablet 100 milliGRAM(s) Oral daily    azithromycin  IVPB   IV Intermittent   azithromycin  IVPB 500 milliGRAM(s) IV Intermittent every 24 hours      traMADol 50 milliGRAM(s) Oral every 6 hours PRN    pantoprazole    Tablet 40 milliGRAM(s) Oral before breakfast    atorvastatin 40 milliGRAM(s) Oral at bedtime        LABS:	 	                            8.8    3.1   )-----------( 212      ( 29 Jun 2017 06:03 )             28.7       proBNP: Serum Pro-Brain Natriuretic Peptide: 192 pg/mL (06-24 @ 14:43)

## 2017-06-30 LAB
HCT VFR BLD CALC: 28.3 % — LOW (ref 37–47)
HGB BLD-MCNC: 8.8 G/DL — LOW (ref 12–16)
INR BLD: 1.12 RATIO — SIGNIFICANT CHANGE UP (ref 0.88–1.16)
MCHC RBC-ENTMCNC: 24.6 PG — LOW (ref 27–31)
MCHC RBC-ENTMCNC: 31.1 G/DL — LOW (ref 32–36)
MCV RBC AUTO: 79.3 FL — LOW (ref 81–99)
PLATELET # BLD AUTO: 204 K/UL — SIGNIFICANT CHANGE UP (ref 150–400)
PROTHROM AB SERPL-ACNC: 12.3 SEC — SIGNIFICANT CHANGE UP (ref 9.8–12.7)
RBC # BLD: 3.57 M/UL — LOW (ref 4.4–5.2)
RBC # FLD: 16.6 % — HIGH (ref 11–15.6)
WBC # BLD: 4 K/UL — LOW (ref 4.8–10.8)
WBC # FLD AUTO: 4 K/UL — LOW (ref 4.8–10.8)

## 2017-06-30 PROCEDURE — 99232 SBSQ HOSP IP/OBS MODERATE 35: CPT

## 2017-06-30 RX ORDER — WARFARIN SODIUM 2.5 MG/1
10 TABLET ORAL ONCE
Qty: 0 | Refills: 0 | Status: COMPLETED | OUTPATIENT
Start: 2017-06-30 | End: 2017-06-30

## 2017-06-30 RX ORDER — ACETAMINOPHEN 500 MG
650 TABLET ORAL EVERY 6 HOURS
Qty: 0 | Refills: 0 | Status: DISCONTINUED | OUTPATIENT
Start: 2017-06-30 | End: 2017-07-02

## 2017-06-30 RX ORDER — FLUTICASONE PROPIONATE 50 MCG
1 SPRAY, SUSPENSION NASAL
Qty: 0 | Refills: 0 | Status: DISCONTINUED | OUTPATIENT
Start: 2017-06-30 | End: 2017-07-02

## 2017-06-30 RX ADMIN — Medication 360 MILLIGRAM(S): at 05:19

## 2017-06-30 RX ADMIN — AMIODARONE HYDROCHLORIDE 100 MILLIGRAM(S): 400 TABLET ORAL at 05:19

## 2017-06-30 RX ADMIN — TRAMADOL HYDROCHLORIDE 50 MILLIGRAM(S): 50 TABLET ORAL at 11:00

## 2017-06-30 RX ADMIN — ALBUTEROL 2.5 MILLIGRAM(S): 90 AEROSOL, METERED ORAL at 14:55

## 2017-06-30 RX ADMIN — ALBUTEROL 2.5 MILLIGRAM(S): 90 AEROSOL, METERED ORAL at 08:43

## 2017-06-30 RX ADMIN — WARFARIN SODIUM 10 MILLIGRAM(S): 2.5 TABLET ORAL at 22:51

## 2017-06-30 RX ADMIN — PANTOPRAZOLE SODIUM 40 MILLIGRAM(S): 20 TABLET, DELAYED RELEASE ORAL at 05:20

## 2017-06-30 RX ADMIN — ALBUTEROL 2.5 MILLIGRAM(S): 90 AEROSOL, METERED ORAL at 20:20

## 2017-06-30 RX ADMIN — TRAMADOL HYDROCHLORIDE 50 MILLIGRAM(S): 50 TABLET ORAL at 11:35

## 2017-06-30 RX ADMIN — ALBUTEROL 2.5 MILLIGRAM(S): 90 AEROSOL, METERED ORAL at 04:02

## 2017-06-30 RX ADMIN — Medication 1 SPRAY(S): at 22:51

## 2017-06-30 RX ADMIN — AZITHROMYCIN 255 MILLIGRAM(S): 500 TABLET, FILM COATED ORAL at 15:10

## 2017-06-30 RX ADMIN — ATORVASTATIN CALCIUM 40 MILLIGRAM(S): 80 TABLET, FILM COATED ORAL at 22:51

## 2017-06-30 RX ADMIN — Medication 650 MILLIGRAM(S): at 18:42

## 2017-06-30 RX ADMIN — LOSARTAN POTASSIUM 100 MILLIGRAM(S): 100 TABLET, FILM COATED ORAL at 05:19

## 2017-06-30 NOTE — PROGRESS NOTE ADULT - SUBJECTIVE AND OBJECTIVE BOX
SUBJECTIVE    REVIEW OF SYSTEMS    General: Not in any pain	    Skin/Breast: No rash  	  ENMT: No visual problems, no sore throat	    Respiratory and Thorax: No cough, No CP, No SOB  	  Cardiovascular: No CP, No palpitations    Gastrointestinal: No Abd pain, No N/V/D    Musculoskeletal: No Joint pain, No back pain	    Neurological: No headache    Psychiatric: No anxiety      OBJECTIVE    Vital Signs Last 24 Hrs  T(C): 36.7 (06-30-17 @ 11:22), Max: 37 (06-30-17 @ 05:16)  T(F): 98 (06-30-17 @ 11:22), Max: 98.6 (06-30-17 @ 05:16)  HR: 73 (06-30-17 @ 18:16) (61 - 74)  BP: 122/54 (06-30-17 @ 18:16) (120/60 - 154/62)  BP(mean): --  RR: 16 (06-30-17 @ 18:16) (16 - 18)  SpO2: 100% (06-30-17 @ 18:16) (98% - 100%)    PHYSICAL EXAM:    Constitutional: Not in any distress    Eyes: No conjunctival injection    ENMT: No oral lesions    Neck: No nodes, no adenopathy    Back: Straight, no defects    Respiratory: clear b/l    Cardiovascular: RRR, no murmur    Gastrointestinal: soft, NT, ND    Extremities: No edema, no erythema    Neurological: no focal deficit    Skin: No rash      MEDICATIONS  (STANDING):  diltiazem    milliGRAM(s) Oral daily  losartan 100 milliGRAM(s) Oral daily  pantoprazole    Tablet 40 milliGRAM(s) Oral before breakfast  amiodarone    Tablet 100 milliGRAM(s) Oral daily  atorvastatin 40 milliGRAM(s) Oral at bedtime  azithromycin  IVPB   IV Intermittent   azithromycin  IVPB 500 milliGRAM(s) IV Intermittent every 24 hours  ALBUTerol    90 MICROgram(s) HFA Inhaler 1 Puff(s) Inhalation every 4 hours  warfarin 10 milliGRAM(s) Oral once  fluticasone propionate 50 MICROgram(s)/spray Nasal Spray 1 Spray(s) Both Nostrils two times a day    MEDICATIONS  (PRN):  traMADol 50 milliGRAM(s) Oral every 6 hours PRN Moderate Pain (4 - 6)  ALBUTerol    0.083% 2.5 milliGRAM(s) Nebulizer every 4 hours PRN Shortness of Breath and/or Wheezing  acetaminophen   Tablet 650 milliGRAM(s) Oral every 6 hours PRN pain                              8.8    4.0   )-----------( 204      ( 30 Jun 2017 05:52 )             28.3           Allergies    clonidine (Unknown)  Darvocet-N 100 (Unknown)  enalapril (Unknown)  hydrochlorothiazide (Unknown)  Keflex (Unknown)  Lyrica (Unknown)  metoprolol (Unknown)  penicillin (Unknown)  sulfa drugs (Unknown)  tetracycline (Unknown)    Intolerances

## 2017-06-30 NOTE — PROGRESS NOTE ADULT - ASSESSMENT
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 Pt now s/p LHC with non-obstructive CAD,      s/p LHC which showed left dominant system with ostial diag mod disease, RCA small vessel non-obstructive;   Denies any chest pain or discomfort  Moderate nonobstructive disease  medical managements  may re-start AC

## 2017-06-30 NOTE — PROGRESS NOTE ADULT - SUBJECTIVE AND OBJECTIVE BOX
CARDIOLOGY PROGRESS NOTE   ([facility])                                                                                                        Subjective: denied CP, dyspnea, PND, edema, palpitation    Vitals:  T(C): 37 (06-30-17 @ 05:16), Max: 37 (06-30-17 @ 05:16)  HR: 64 (06-30-17 @ 05:16) (59 - 74)  BP: 141/53 (06-30-17 @ 05:16) (136/72 - 180/78)  RR: 18 (06-30-17 @ 05:16) (16 - 19)  SpO2: 99% (06-30-17 @ 05:16) (95% - 99%)  Wt(kg): --  I&O's Summary    29 Jun 2017 07:01  -  30 Jun 2017 07:00  --------------------------------------------------------  IN: 200 mL / OUT: 0 mL / NET: 200 mL          PHYSICAL EXAM:  Appearance: Normal	  HEENT:   Atraumatic  Cardiovascular: Normal S1 S2, No JVD, No murmurs, No edema  Respiratory: Lungs clear to auscultation	  Gastrointestinal:  Soft, Non-tender, + BS	  Skin: No rashes, No ecchymoses, No cyanosis  Neurologic: Alert and awake, able to move extremities  Extremities: No edema        CURRENT MEDICATIONS:  diltiazem    milliGRAM(s) Oral daily  losartan 100 milliGRAM(s) Oral daily  amiodarone    Tablet 100 milliGRAM(s) Oral daily    azithromycin  IVPB   IV Intermittent   azithromycin  IVPB 500 milliGRAM(s) IV Intermittent every 24 hours    ALBUTerol    0.083% 2.5 milliGRAM(s) Nebulizer every 4 hours PRN  ALBUTerol    90 MICROgram(s) HFA Inhaler 1 Puff(s) Inhalation every 4 hours    traMADol 50 milliGRAM(s) Oral every 6 hours PRN    pantoprazole    Tablet 40 milliGRAM(s) Oral before breakfast    atorvastatin 40 milliGRAM(s) Oral at bedtime        LABS:	 	    CARDIAC MARKERS:                            8.8    4.0   )-----------( 204      ( 30 Jun 2017 05:52 )             28.3

## 2017-06-30 NOTE — PROGRESS NOTE ADULT - SUBJECTIVE AND OBJECTIVE BOX
HPI:  Pt is a 75 yo female with pmh of afib, recurrent lower extremity dvt's, pre diabetes, htn, fibromyalgia, hiatal hernia, GAVE, gerd, ckd,, thyroid nodules, neuropathy to lower extremities, anemia presenting with abnormal labs.     Pt had recent stress test 3/30/17  done which showed mild-mod defect to mid to distal anterior wall. Pt was scheduled for cardiac catherization this 17 and had pre surgical testing done. At pre surgical testing pt's labs were drawn. Pt recieved a call to come to ED for low hemoglobin. Coumadin was on hold. Pt took one dose on 17 of 5mg.  Underwent cardiac cath yesterday.      LHC  s/p LHC which showed left dominant system with ostial diag mod disease, RCA small vessel non-obstructive;     PMH  Anemia  H/o or current diagnosis of HF- ACEI/ARB contraindication unknown  Family history of cancer  Family history of heart disease (Father)  Family history of cancer in mother (Mother)  DVT (deep venous thrombosis)  Arthritis  Angina pectoris  Atrial fibrillation  Fibromyalgia  Diabetes  GERD (gastroesophageal reflux disease)  HTN (hypertension)  Anemia  Bronchitis  Anemia  Gastroesophageal reflux disease without esophagitis  GAVE (gastric antral vascular ectasia)  Iron deficiency anemia, unspecified iron deficiency anemia type  Syncope  HTN (hypertension)  GERD (gastroesophageal reflux disease)  Diabetes  Atrial fibrillation  DVT (deep venous thrombosis)  Anemia, unspecified type  S/P ankle arthrodesis  S/P tonsillectomy  S/P appendectomy  S/P hemorrhoidectomy  S/P hysterectomy  SENT FOR BLOOD TRANSFUSION  90+      REVIEW OF SYSTEMS  General: Denies fever, chills, pain, no discomfort  Respiratory and Thorax:  Denies cough, sob, or any discomfort  Cardiovascular:  Denies chest pain, palpitations or any discomfort	  Gastrointestinal:  Denies n/v/d, constipation, or any discomfort  Genitourinary:  Denies frequency, burning, or pain  Musculoskeletal:  Denies joint pain, swelling, or any discomfort   Neurological:  Denies headache, dizziness blurred vision, numbing or tingling  Hematology  He bleeding or swelling    Allergic/Immunologic:	  MEDICATIONS:  diltiazem    milliGRAM(s) Oral daily  losartan 100 milliGRAM(s) Oral daily  amiodarone    Tablet 100 milliGRAM(s) Oral daily  azithromycin  IVPB   IV Intermittent   azithromycin  IVPB 500 milliGRAM(s) IV Intermittent every 24 hours  ALBUTerol    0.083% 2.5 milliGRAM(s) Nebulizer every 4 hours PRN  ALBUTerol    90 MICROgram(s) HFA Inhaler 1 Puff(s) Inhalation every 4 hours  traMADol 50 milliGRAM(s) Oral every 6 hours PRN  pantoprazole    Tablet 40 milliGRAM(s) Oral before breakfast  [id number] 40 milliGRAM(s) Oral at bedtime    PHYSICAL EXAM:  T(C): 36.7 (17 @ 11:22), Max: 37 (17 @ 05:16)  HR: 61 (17 @ 11:22) (59 - 74)  BP: 120/60 (17 @ 11:22) (120/60 - 180/78)  RR: 18 (17 @ 11:22) (16 - 19)  SpO2: 100% (17 @ 11:22) (95% - 100%)  Wt(kg): --  I&O's Summary  2017 07:  -  2017 07:00  --------------------------------------------------------  IN: 200 mL / OUT: 0 mL / NET: 200 mL  2017 07:  -  2017 12:22  --------------------------------------------------------  IN: 240 mL / OUT: 0 mL / NET: 240 mL  Daily Weight in k (2017 05:00)    EXAM  Appearance: Normal	  HEENT:   Normal oral mucosa, PERRL, EOMI	  Lymphatic: No lymphadenopathy  Cardiovascular:  No edema  Respiratory:   RR wnl for rate and rhythm, color pink	  Psychiatry: A & O x 3, Mood & affect appropriate  Gastrointestinal:  Soft, Non-tender, + BS	  Skin: No rashes, No ecchymoses, No cyanosis  Neurologic: Non-focal  Extremities: Normal range of motion, No clubbing, cyanosis or edema  Vascular: Peripheral pulses palpable 2+ bilaterally  Procedure Site:  Right radial, large bruising noted, patient stated it spasmodic yesterday, but feels better today.  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     Pt had recent stress test 3/30/17  done which showed mild-mod defect to mid to distal anterior wall. Pt was scheduled for cardiac catherization this 17 and had pre surgical testing done. At pre surgical testing pt's labs were drawn. Pt recieved a call to come to ED for low hemoglobin. Coumadin was on hold. Pt took one dose on 17 of 5mg.  Underwent cardiac cath yesterday.      LHC  s/p LHC which showed left dominant system with ostial diag mod disease, RCA small vessel non-obstructive;     PMH  Anemia  H/o or current diagnosis of HF- ACEI/ARB contraindication unknown  Family history of cancer  Family history of heart disease (Father)  Family history of cancer in mother (Mother)  DVT (deep venous thrombosis)  Arthritis  Angina pectoris  Atrial fibrillation  Fibromyalgia  Diabetes  GERD (gastroesophageal reflux disease)  HTN (hypertension)  Anemia  Bronchitis  Anemia  Gastroesophageal reflux disease without esophagitis  GAVE (gastric antral vascular ectasia)  Iron deficiency anemia, unspecified iron deficiency anemia type  Syncope  HTN (hypertension)  GERD (gastroesophageal reflux disease)  Diabetes  Atrial fibrillation  DVT (deep venous thrombosis)  Anemia, unspecified type  S/P ankle arthrodesis  S/P tonsillectomy  S/P appendectomy  S/P hemorrhoidectomy  S/P hysterectomy  SENT FOR BLOOD TRANSFUSION  90+      REVIEW OF SYSTEMS  General: Denies fever, chills, pain, no discomfort  Respiratory and Thorax:  Denies cough, sob, or any discomfort  Cardiovascular:  Denies chest pain, palpitations or any discomfort	  Gastrointestinal:  Denies n/v/d, constipation, or any discomfort  Genitourinary:  Denies frequency, burning, or pain  Musculoskeletal:  Denies joint pain, swelling, or any discomfort   Neurological:  Denies headache, dizziness blurred vision, numbing or tingling  Hematology  He bleeding or swelling    Allergic/Immunologic:	  MEDICATIONS:  diltiazem    milliGRAM(s) Oral daily  losartan 100 milliGRAM(s) Oral daily  amiodarone    Tablet 100 milliGRAM(s) Oral daily  azithromycin  IVPB   IV Intermittent   azithromycin  IVPB 500 milliGRAM(s) IV Intermittent every 24 hours  ALBUTerol    0.083% 2.5 milliGRAM(s) Nebulizer every 4 hours PRN  ALBUTerol    90 MICROgram(s) HFA Inhaler 1 Puff(s) Inhalation every 4 hours  traMADol 50 milliGRAM(s) Oral every 6 hours PRN  pantoprazole    Tablet 40 milliGRAM(s) Oral before breakfast  [id number] 40 milliGRAM(s) Oral at bedtime    PHYSICAL EXAM:  T(C): 36.7 (17 @ 11:22), Max: 37 (17 @ 05:16)  HR: 61 (17 @ 11:22) (59 - 74)  BP: 120/60 (17 @ 11:22) (120/60 - 180/78)  RR: 18 (17 @ 11:22) (16 - 19)  SpO2: 100% (17 @ 11:22) (95% - 100%)  Wt(kg): --  I&O's Summary  2017 07:  -  2017 07:00  --------------------------------------------------------  IN: 200 mL / OUT: 0 mL / NET: 200 mL  2017 07:  -  2017 12:22  --------------------------------------------------------  IN: 240 mL / OUT: 0 mL / NET: 240 mL  Daily Weight in k (2017 05:00)    EXAM  Appearance: Normal	  HEENT:   Normal oral mucosa, PERRL, EOMI	  Lymphatic: No lymphadenopathy  Cardiovascular:  No edema  Respiratory:   RR wnl for rate and rhythm, color pink	  Psychiatry: A & O x 3, Mood & affect appropriate  Gastrointestinal:  Soft, Non-tender, + BS	  Skin: No rashes, No ecchymoses, No cyanosis  Neurologic: Non-focal  Extremities: Normal range of motion, No clubbing, cyanosis or edema  Vascular: Peripheral pulses palpable 2+ bilaterally  Procedure Site:  Right radial, large bruising noted, patient stated it spasmodic yesterday, but feels better today.  No hematoma, no bleeding, no pain, soft, no edema +PP      TELEMETRY: 	  SR, 70, no ectopy or overnight events             8.8    4.0   )-----------( 204      ( 2017 05:52 )             28.3     · Subjective and Objective: 	  s/p LHC which showed left dominant system with ostial diag mod disease, RCA small vessel non-obstructive; official report pending  medical managements  may re-start AC as per cardiology  follow up with cardiology 1-2 weeks

## 2017-07-01 LAB
HCT VFR BLD CALC: 27.9 % — LOW (ref 37–47)
HGB BLD-MCNC: 8.5 G/DL — LOW (ref 12–16)
INR BLD: 1.07 RATIO — SIGNIFICANT CHANGE UP (ref 0.88–1.16)
MCHC RBC-ENTMCNC: 24.3 PG — LOW (ref 27–31)
MCHC RBC-ENTMCNC: 30.5 G/DL — LOW (ref 32–36)
MCV RBC AUTO: 79.7 FL — LOW (ref 81–99)
PLATELET # BLD AUTO: 174 K/UL — SIGNIFICANT CHANGE UP (ref 150–400)
PROTHROM AB SERPL-ACNC: 11.8 SEC — SIGNIFICANT CHANGE UP (ref 9.8–12.7)
RBC # BLD: 3.5 M/UL — LOW (ref 4.4–5.2)
RBC # FLD: 17 % — HIGH (ref 11–15.6)
WBC # BLD: 2.4 K/UL — LOW (ref 4.8–10.8)
WBC # FLD AUTO: 2.4 K/UL — LOW (ref 4.8–10.8)

## 2017-07-01 RX ORDER — WARFARIN SODIUM 2.5 MG/1
10 TABLET ORAL ONCE
Qty: 0 | Refills: 0 | Status: COMPLETED | OUTPATIENT
Start: 2017-07-01 | End: 2017-07-01

## 2017-07-01 RX ORDER — IRON SUCROSE 20 MG/ML
200 INJECTION, SOLUTION INTRAVENOUS ONCE
Qty: 0 | Refills: 0 | Status: COMPLETED | OUTPATIENT
Start: 2017-07-01 | End: 2017-07-01

## 2017-07-01 RX ADMIN — IRON SUCROSE 110 MILLIGRAM(S): 20 INJECTION, SOLUTION INTRAVENOUS at 23:39

## 2017-07-01 RX ADMIN — WARFARIN SODIUM 10 MILLIGRAM(S): 2.5 TABLET ORAL at 22:03

## 2017-07-01 RX ADMIN — PANTOPRAZOLE SODIUM 40 MILLIGRAM(S): 20 TABLET, DELAYED RELEASE ORAL at 05:40

## 2017-07-01 RX ADMIN — Medication 1 SPRAY(S): at 05:40

## 2017-07-01 RX ADMIN — ALBUTEROL 2.5 MILLIGRAM(S): 90 AEROSOL, METERED ORAL at 16:14

## 2017-07-01 RX ADMIN — Medication 1 SPRAY(S): at 17:36

## 2017-07-01 RX ADMIN — AMIODARONE HYDROCHLORIDE 100 MILLIGRAM(S): 400 TABLET ORAL at 05:40

## 2017-07-01 RX ADMIN — ATORVASTATIN CALCIUM 40 MILLIGRAM(S): 80 TABLET, FILM COATED ORAL at 22:03

## 2017-07-01 RX ADMIN — Medication 360 MILLIGRAM(S): at 05:40

## 2017-07-01 RX ADMIN — TRAMADOL HYDROCHLORIDE 50 MILLIGRAM(S): 50 TABLET ORAL at 14:50

## 2017-07-01 RX ADMIN — TRAMADOL HYDROCHLORIDE 50 MILLIGRAM(S): 50 TABLET ORAL at 15:00

## 2017-07-01 RX ADMIN — AZITHROMYCIN 255 MILLIGRAM(S): 500 TABLET, FILM COATED ORAL at 15:31

## 2017-07-01 RX ADMIN — LOSARTAN POTASSIUM 100 MILLIGRAM(S): 100 TABLET, FILM COATED ORAL at 05:40

## 2017-07-02 VITALS — SYSTOLIC BLOOD PRESSURE: 130 MMHG | HEART RATE: 78 BPM | DIASTOLIC BLOOD PRESSURE: 70 MMHG

## 2017-07-02 LAB
HCT VFR BLD CALC: 27.3 % — LOW (ref 37–47)
HGB BLD-MCNC: 8.4 G/DL — LOW (ref 12–16)
INR BLD: 1.11 RATIO — SIGNIFICANT CHANGE UP (ref 0.88–1.16)
MCHC RBC-ENTMCNC: 24.6 PG — LOW (ref 27–31)
MCHC RBC-ENTMCNC: 30.8 G/DL — LOW (ref 32–36)
MCV RBC AUTO: 80.1 FL — LOW (ref 81–99)
PLATELET # BLD AUTO: 185 K/UL — SIGNIFICANT CHANGE UP (ref 150–400)
PROTHROM AB SERPL-ACNC: 12.2 SEC — SIGNIFICANT CHANGE UP (ref 9.8–12.7)
RBC # BLD: 3.41 M/UL — LOW (ref 4.4–5.2)
RBC # FLD: 16.9 % — HIGH (ref 11–15.6)
WBC # BLD: 2.6 K/UL — LOW (ref 4.8–10.8)
WBC # FLD AUTO: 2.6 K/UL — LOW (ref 4.8–10.8)

## 2017-07-02 RX ORDER — LOPERAMIDE HCL 2 MG
1 TABLET ORAL
Qty: 0 | Refills: 0 | COMMUNITY

## 2017-07-02 RX ORDER — TRAMADOL HYDROCHLORIDE 50 MG/1
1 TABLET ORAL
Qty: 0 | Refills: 0 | COMMUNITY
Start: 2017-07-02

## 2017-07-02 RX ADMIN — Medication 360 MILLIGRAM(S): at 05:36

## 2017-07-02 RX ADMIN — Medication 1 SPRAY(S): at 05:41

## 2017-07-02 RX ADMIN — PANTOPRAZOLE SODIUM 40 MILLIGRAM(S): 20 TABLET, DELAYED RELEASE ORAL at 05:41

## 2017-07-02 RX ADMIN — LOSARTAN POTASSIUM 100 MILLIGRAM(S): 100 TABLET, FILM COATED ORAL at 05:36

## 2017-07-02 RX ADMIN — AMIODARONE HYDROCHLORIDE 100 MILLIGRAM(S): 400 TABLET ORAL at 05:36

## 2017-07-02 NOTE — DISCHARGE NOTE ADULT - CARE PROVIDER_API CALL
Eleuterio Mills), Family Medicine  [address] [place] [id number]  Phone: [phone]  Fax: [phone]

## 2017-07-02 NOTE — PROGRESS NOTE ADULT - SUBJECTIVE AND OBJECTIVE BOX
SUBJECTIVE    REVIEW OF SYSTEMS    General: Not in any pain	    Skin/Breast: No rash  	  ENMT: No visual problems, no sore throat	    Respiratory and Thorax: No cough, No CP, No SOB  	  Cardiovascular: No CP, No palpitations    Gastrointestinal: No Abd pain, No N/V/D    Musculoskeletal: No Joint pain, No back pain	    Neurological: No headache    Psychiatric: No anxiety      OBJECTIVE    Vital Signs Last 24 Hrs  T(C): 36.6 (07-02-17 @ 05:30), Max: 37.6 (07-01-17 @ 15:08)  T(F): 97.8 (07-02-17 @ 05:30), Max: 99.6 (07-01-17 @ 15:08)  HR: 73 (07-02-17 @ 09:00) (64 - 80)  BP: 160/70 (07-02-17 @ 09:00) (142/68 - 160/70)  BP(mean): --  RR: 16 (07-02-17 @ 09:00) (15 - 18)  SpO2: 100% (07-02-17 @ 09:00) (95% - 100%)    PHYSICAL EXAM:    Constitutional: Not in any distress    Eyes: No conjunctival injection    ENMT: No oral lesions    Neck: No nodes, no adenopathy    Back: Straight, no defects    Respiratory: clear b/l    Cardiovascular: RRR, no murmur    Gastrointestinal: soft, NT, ND    Extremities: No edema, no erythema    Neurological: no focal deficit    Skin: No rash      MEDICATIONS  (STANDING):  diltiazem    milliGRAM(s) Oral daily  losartan 100 milliGRAM(s) Oral daily  pantoprazole    Tablet 40 milliGRAM(s) Oral before breakfast  amiodarone    Tablet 100 milliGRAM(s) Oral daily  atorvastatin 40 milliGRAM(s) Oral at bedtime  ALBUTerol    90 MICROgram(s) HFA Inhaler 1 Puff(s) Inhalation every 4 hours  fluticasone propionate 50 MICROgram(s)/spray Nasal Spray 1 Spray(s) Both Nostrils two times a day    MEDICATIONS  (PRN):  traMADol 50 milliGRAM(s) Oral every 6 hours PRN Moderate Pain (4 - 6)  ALBUTerol    0.083% 2.5 milliGRAM(s) Nebulizer every 4 hours PRN Shortness of Breath and/or Wheezing  acetaminophen   Tablet 650 milliGRAM(s) Oral every 6 hours PRN pain                              8.4    2.6   )-----------( 185      ( 02 Jul 2017 08:47 )             27.3           Allergies    clonidine (Unknown)  Darvocet-N 100 (Unknown)  enalapril (Unknown)  hydrochlorothiazide (Unknown)  Keflex (Unknown)  Lyrica (Unknown)  metoprolol (Unknown)  penicillin (Unknown)  sulfa drugs (Unknown)  tetracycline (Unknown)    Intolerances

## 2017-07-02 NOTE — PROGRESS NOTE ADULT - PROBLEM SELECTOR PROBLEM 1
Anemia
Atrial fibrillation
Anemia
Anemia, unspecified type

## 2017-07-02 NOTE — PROGRESS NOTE ADULT - PROBLEM SELECTOR PLAN 2
coumadin 10 mg, inr in am
check h&h in am
cont home meds, coumadin 6mg /day
for outpt fu with GI (Alfreda)
no anticoagulation at this time
pt refusing any injectable anticoagulation; will start coumadin tomorrow if H&H stable;
pt refusing lovenox/heparin, will consider restart coumadin if anemia stabilizes
start coumadin 10

## 2017-07-02 NOTE — DISCHARGE NOTE ADULT - HOSPITAL COURSE
77 yo female sent to hosp for anemia and cardiac cath by outpt cardiologist  admitted, transfused had cardiac cath, no disease  pt developed bronchitis, treated with zithro, improved  for d/c home on same meds; outpt colonoscopy, heme f/u

## 2017-07-02 NOTE — DISCHARGE NOTE ADULT - PATIENT PORTAL LINK FT
“You can access the [facility] Patient Portal, offered by [facility], by registering with the following website: [email or web address]”

## 2017-07-02 NOTE — PROGRESS NOTE ADULT - PROBLEM SELECTOR PROBLEM 2
Anemia, unspecified type
Atrial fibrillation
DVT (deep venous thrombosis)
GAVE (gastric antral vascular ectasia)

## 2017-07-02 NOTE — DISCHARGE NOTE ADULT - CARE PLAN
Principal Discharge DX:	Anemia  Goal:	home  Instructions for follow-up, activity and diet:	low salt diet  Secondary Diagnosis:	Atrial fibrillation  Secondary Diagnosis:	HTN (hypertension)

## 2017-07-02 NOTE — DISCHARGE NOTE ADULT - MEDICATION SUMMARY - MEDICATIONS TO TAKE
I will START or STAY ON the medications listed below when I get home from the hospital:    vitamin D  -- 2000 unit(s) by mouth once a day  -- Indication: For vitamin    ultraset  -- 1 tab(s) by mouth 3 times a day, As Needed  -- Indication: For pain    traMADol 50 mg oral tablet  -- 1 tab(s) by mouth every 6 hours, As needed, Moderate Pain (4 - 6)  -- Indication: For pain    losartan 100 mg oral tablet  -- 1 tab(s) by mouth once a day  -- Indication: For HTN (hypertension)    amiodarone 100 mg oral tablet  -- 1 tab(s) by mouth once a day  -- Indication: For Afib    dilTIAZem 24 hour extended release  -- 360 milligram(s) by mouth once a day  -- Indication: For HTN (hypertension)    Coumadin  -- 5mg daily  -- Indication: For Afib    Lipitor 40 mg oral tablet  -- 1 tab(s) by mouth once a day  -- Indication: For cholesterol    furosemide 40 mg oral tablet  -- 1 tab(s) by mouth once a day  -- Indication: For waterpill    pantoprazole  -- 40  by mouth once a day (in the morning)  -- Indication: For Gastroesophageal reflux disease without esophagitis    fluticasone 50 mcg inhalation powder  -- 1 puff(s) inhaled 2 times a day  -- Indication: For nose spray

## 2017-07-02 NOTE — PROGRESS NOTE ADULT - PROBLEM SELECTOR PROBLEM 3
HTN (hypertension)
HTN (hypertension)
Atrial fibrillation
Atrial fibrillation
Bronchitis
Diabetes
HTN (hypertension)
HTN (hypertension)

## 2017-07-02 NOTE — PROGRESS NOTE ADULT - PROBLEM SELECTOR PLAN 3
monitor
stable
cardio eval as pt had cardiac cath planned
considering adding med
cont abx, change to PO tomorrow
cont coverage, change to AC and HS
pamag on hold for now
stable with losartan, for d/c home

## 2017-07-10 ENCOUNTER — APPOINTMENT (OUTPATIENT)
Dept: ELECTROPHYSIOLOGY | Facility: CLINIC | Age: 77
End: 2017-07-10

## 2017-07-21 ENCOUNTER — APPOINTMENT (OUTPATIENT)
Dept: CARDIOLOGY | Facility: CLINIC | Age: 77
End: 2017-07-21

## 2017-07-21 VITALS
DIASTOLIC BLOOD PRESSURE: 85 MMHG | HEIGHT: 68 IN | HEART RATE: 68 BPM | OXYGEN SATURATION: 98 % | SYSTOLIC BLOOD PRESSURE: 162 MMHG

## 2017-07-21 VITALS — DIASTOLIC BLOOD PRESSURE: 76 MMHG | SYSTOLIC BLOOD PRESSURE: 164 MMHG

## 2017-07-21 VITALS — BODY MASS INDEX: 38.93 KG/M2 | WEIGHT: 256 LBS

## 2017-07-21 DIAGNOSIS — R42 DIZZINESS AND GIDDINESS: ICD-10-CM

## 2017-07-21 RX ORDER — TRAMADOL HYDROCHLORIDE AND ACETAMINOPHEN 37.5; 325 MG/1; MG/1
37.5-325 TABLET, FILM COATED ORAL
Refills: 0 | Status: DISCONTINUED | COMMUNITY
End: 2017-07-21

## 2017-07-21 RX ORDER — BENZONATATE 100 MG/1
100 CAPSULE ORAL
Qty: 30 | Refills: 0 | Status: DISCONTINUED | COMMUNITY
Start: 2017-07-05

## 2017-07-21 RX ORDER — NITROGLYCERIN 0.4 MG/1
0.4 TABLET SUBLINGUAL
Qty: 50 | Refills: 0 | Status: DISCONTINUED | COMMUNITY
Start: 2017-05-24

## 2017-07-21 RX ORDER — CLOTRIMAZOLE AND BETAMETHASONE DIPROPIONATE 10; .5 MG/G; MG/G
1-0.05 CREAM TOPICAL
Qty: 60 | Refills: 0 | Status: DISCONTINUED | COMMUNITY
Start: 2017-01-31

## 2017-08-07 ENCOUNTER — MEDICATION RENEWAL (OUTPATIENT)
Age: 77
End: 2017-08-07

## 2017-08-11 ENCOUNTER — APPOINTMENT (OUTPATIENT)
Dept: ELECTROPHYSIOLOGY | Facility: CLINIC | Age: 77
End: 2017-08-11
Payer: MEDICARE

## 2017-08-11 PROCEDURE — 93299: CPT

## 2017-08-11 PROCEDURE — 93298 REM INTERROG DEV EVAL SCRMS: CPT

## 2017-09-15 ENCOUNTER — APPOINTMENT (OUTPATIENT)
Dept: ELECTROPHYSIOLOGY | Facility: CLINIC | Age: 77
End: 2017-09-15
Payer: MEDICARE

## 2017-09-15 PROCEDURE — 93299: CPT

## 2017-09-15 PROCEDURE — 93298 REM INTERROG DEV EVAL SCRMS: CPT

## 2017-10-11 PROCEDURE — 86900 BLOOD TYPING SEROLOGIC ABO: CPT

## 2017-10-11 PROCEDURE — 85730 THROMBOPLASTIN TIME PARTIAL: CPT

## 2017-10-11 PROCEDURE — 93005 ELECTROCARDIOGRAM TRACING: CPT

## 2017-10-11 PROCEDURE — 93454 CORONARY ARTERY ANGIO S&I: CPT

## 2017-10-11 PROCEDURE — 86901 BLOOD TYPING SEROLOGIC RH(D): CPT

## 2017-10-11 PROCEDURE — 86850 RBC ANTIBODY SCREEN: CPT

## 2017-10-11 PROCEDURE — 83550 IRON BINDING TEST: CPT

## 2017-10-11 PROCEDURE — 36415 COLL VENOUS BLD VENIPUNCTURE: CPT

## 2017-10-11 PROCEDURE — 80053 COMPREHEN METABOLIC PANEL: CPT

## 2017-10-11 PROCEDURE — G0463: CPT

## 2017-10-11 PROCEDURE — 82728 ASSAY OF FERRITIN: CPT

## 2017-10-11 PROCEDURE — 99285 EMERGENCY DEPT VISIT HI MDM: CPT | Mod: 25

## 2017-10-11 PROCEDURE — 84466 ASSAY OF TRANSFERRIN: CPT

## 2017-10-11 PROCEDURE — 80048 BASIC METABOLIC PNL TOTAL CA: CPT

## 2017-10-11 PROCEDURE — P9016: CPT

## 2017-10-11 PROCEDURE — 83880 ASSAY OF NATRIURETIC PEPTIDE: CPT

## 2017-10-11 PROCEDURE — 71045 X-RAY EXAM CHEST 1 VIEW: CPT

## 2017-10-11 PROCEDURE — 94640 AIRWAY INHALATION TREATMENT: CPT

## 2017-10-11 PROCEDURE — 82272 OCCULT BLD FECES 1-3 TESTS: CPT

## 2017-10-11 PROCEDURE — 36430 TRANSFUSION BLD/BLD COMPNT: CPT

## 2017-10-11 PROCEDURE — 82550 ASSAY OF CK (CPK): CPT

## 2017-10-11 PROCEDURE — 86920 COMPATIBILITY TEST SPIN: CPT

## 2017-10-11 PROCEDURE — C1894: CPT

## 2017-10-11 PROCEDURE — 85027 COMPLETE CBC AUTOMATED: CPT

## 2017-10-11 PROCEDURE — C1769: CPT

## 2017-10-11 PROCEDURE — 85045 AUTOMATED RETICULOCYTE COUNT: CPT

## 2017-10-11 PROCEDURE — C1887: CPT

## 2017-10-11 PROCEDURE — 84484 ASSAY OF TROPONIN QUANT: CPT

## 2017-10-11 PROCEDURE — 85610 PROTHROMBIN TIME: CPT

## 2017-10-20 ENCOUNTER — APPOINTMENT (OUTPATIENT)
Dept: ELECTROPHYSIOLOGY | Facility: CLINIC | Age: 77
End: 2017-10-20
Payer: MEDICARE

## 2017-10-20 PROCEDURE — 93298 REM INTERROG DEV EVAL SCRMS: CPT

## 2017-10-20 PROCEDURE — 93299: CPT

## 2017-11-20 ENCOUNTER — APPOINTMENT (OUTPATIENT)
Dept: ELECTROPHYSIOLOGY | Facility: CLINIC | Age: 77
End: 2017-11-20
Payer: MEDICARE

## 2017-11-20 PROCEDURE — 93299: CPT

## 2017-11-20 PROCEDURE — 93298 REM INTERROG DEV EVAL SCRMS: CPT

## 2017-11-22 ENCOUNTER — APPOINTMENT (OUTPATIENT)
Dept: PULMONOLOGY | Facility: CLINIC | Age: 77
End: 2017-11-22

## 2017-12-22 ENCOUNTER — APPOINTMENT (OUTPATIENT)
Dept: ELECTROPHYSIOLOGY | Facility: CLINIC | Age: 77
End: 2017-12-22
Payer: MEDICARE

## 2017-12-22 PROCEDURE — 93299: CPT

## 2017-12-22 PROCEDURE — 93298 REM INTERROG DEV EVAL SCRMS: CPT

## 2018-01-11 ENCOUNTER — MEDICATION RENEWAL (OUTPATIENT)
Age: 78
End: 2018-01-11

## 2018-01-12 ENCOUNTER — MEDICATION RENEWAL (OUTPATIENT)
Age: 78
End: 2018-01-12

## 2018-01-22 ENCOUNTER — APPOINTMENT (OUTPATIENT)
Dept: ELECTROPHYSIOLOGY | Facility: CLINIC | Age: 78
End: 2018-01-22
Payer: MEDICARE

## 2018-01-22 PROCEDURE — 93298 REM INTERROG DEV EVAL SCRMS: CPT

## 2018-01-22 PROCEDURE — 93299: CPT

## 2018-02-23 ENCOUNTER — APPOINTMENT (OUTPATIENT)
Dept: ELECTROPHYSIOLOGY | Facility: CLINIC | Age: 78
End: 2018-02-23
Payer: MEDICARE

## 2018-02-23 PROCEDURE — 93298 REM INTERROG DEV EVAL SCRMS: CPT

## 2018-02-23 PROCEDURE — 93299: CPT

## 2018-03-11 NOTE — ED ADULT TRIAGE NOTE - PAIN RATING/NUMBER SCALE (0-10): REST
Subjective     Anna Watt is a 16 y.o.. female.     Sore Throat   This is a new problem. Episode onset: for 2 days. The problem has been unchanged. Associated symptoms include congestion, coughing, a fever (101.5 last night), headaches, nausea and a sore throat. Pertinent negatives include no abdominal pain or vomiting. Treatments tried: sudafed, cough otc, mucinex. The treatment provided no relief.       The following portions of the patient's history were reviewed and updated as appropriate: allergies, current medications, past family history, past medical history, past social history, past surgical history and problem list.    Review of Systems   Constitutional: Positive for fever (101.5 last night).   HENT: Positive for congestion, ear pain (clogged), rhinorrhea and sore throat.    Respiratory: Positive for cough.    Gastrointestinal: Positive for diarrhea and nausea. Negative for abdominal pain and vomiting.   Genitourinary: Negative.    Neurological: Positive for headaches.       Objective     Vitals:    03/11/18 1448   Pulse: 89   Temp: 98.5 °F (36.9 °C)   TempSrc: Oral   SpO2: 97%   Weight: 50.8 kg (112 lb)       Physical Exam   Constitutional: She is oriented to person, place, and time. She appears well-developed and well-nourished.   HENT:   Head: Normocephalic and atraumatic.   Right Ear: Tympanic membrane normal. There is drainage (clear). Tympanic membrane is not erythematous.   Left Ear: Tympanic membrane normal. There is drainage (clear). Tympanic membrane is not erythematous.   Nose: Mucosal edema present. Right sinus exhibits maxillary sinus tenderness. Left sinus exhibits maxillary sinus tenderness.   Mouth/Throat: Posterior oropharyngeal erythema present. Oropharyngeal exudate: pnd.   Eyes: Conjunctivae are normal. Pupils are equal, round, and reactive to light.   Cardiovascular: Normal rate and regular rhythm.    No murmur heard.  Pulmonary/Chest: Effort normal. She has no wheezes. She has no  rhonchi. She has no rales.   Musculoskeletal: Normal range of motion.   Lymphadenopathy:     She has cervical adenopathy.   Neurological: She is alert and oriented to person, place, and time.   Skin: Skin is warm and dry.   Vitals reviewed.      Lab Results (last 24 hours)     Procedure Component Value Units Date/Time    POC Influenza A / B [[id number]]  (Abnormal) Collected:  03/11/18 1523    Specimen:  Swab Updated:  03/11/18 1524     Rapid Influenza A Ag neg     Rapid Influenza B Ag pos     Internal Control Passed     Lot Number [id number]     Expiration Date 2020-12-06    POC Rapid Strep A [[id number]]  (Normal) Collected:  03/11/18 1523    Specimen:  Swab Updated:  03/11/18 1523     Rapid Strep A Screen Negative     Internal Control Passed     Lot Number [id number]     Expiration Date 2019-05-31          Assessment/Plan   Anna was seen today for sore throat.    Diagnoses and all orders for this visit:    Acute pharyngitis, unspecified etiology  -     POC Rapid Strep A    Acute sinusitis, recurrence not specified, unspecified location  -     Discontinue: cefdinir (OMNICEF) 300 MG capsule; Take 1 capsule by mouth 2 (Two) Times a Day for 10 days.  -     cefdinir (OMNICEF) 300 MG capsule; Take 1 capsule by mouth 2 (Two) Times a Day for 10 days.    Arthralgia, unspecified joint  -     POC Influenza A / B    Influenza B        Patient Instructions   Influenza, Pediatric  Influenza, more commonly known as “the flu,” is a viral infection that primarily affects your child's respiratory tract. The respiratory tract includes organs that help your child breathe, such as the lungs, nose, and throat. The flu causes many common cold symptoms, as well as a high fever and body aches.  The flu spreads easily from person to person (is contagious). Having your child get a flu shot (influenza vaccination) every year is the best way to prevent influenza.  What are the causes?  Influenza is caused by a virus. Your child can catch the  virus by:  · Breathing in droplets from an infected person's cough or sneeze.  · Touching something that was recently contaminated with the virus and then touching his or her mouth, nose, or eyes.  What increases the risk?  Your child may be more likely to get the flu if he or she:  · Does not clean his or her hands frequently with soap and water or alcohol-based hand .  · Has close contact with many people during cold and flu season.  · Touches his or her mouth, eyes, or nose without washing or sanitizing his or her hands first.  · Does not drink enough fluids or does not eat a healthy diet.  · Does not get enough sleep or exercise.  · Is under a high amount of stress.  · Does not get a yearly (annual) flu shot.  Your child may be at a higher risk of complications from the flu, such as a severe lung infection (pneumonia), if he or she:  · Has a weakened disease-fighting system (immune system). Your child may have a weakened immune system if he or she:  ¨ Has HIV or AIDS.  ¨ Is undergoing chemotherapy.  ¨ Is taking medicines that reduce the activity of (suppress) the immune system.  · Has a long-term (chronic) illness, such as heart disease, kidney disease, diabetes, or lung disease.  · Has a liver disorder.  · Has anemia.  What are the signs or symptoms?  Symptoms of this condition typically last 4-10 days. Symptoms can vary depending on your child's age, and they may include:  · Fever.  · Chills.  · Headache, body aches, or muscle aches.  · Sore throat.  · Cough.  · Runny or congested nose.  · Chest discomfort and cough.  · Poor appetite.  · Weakness or tiredness (fatigue).  · Dizziness.  · Nausea or vomiting.  How is this diagnosed?  This condition may be diagnosed based on your child's medical history and a physical exam. Your child's health care provider may do a nose or throat swab test to confirm the diagnosis.  How is this treated?  If influenza is detected early, your child can be treated with  antiviral medicine. Antiviral medicine can reduce the length of your child's illness and the severity of his or her symptoms. This medicine may be given by mouth (orally) or through an IV tube that is inserted in one of your child's veins.  The goal of treatment is to relieve your child's symptoms by taking care of your child at home. This may include having your child take over-the-counter medicines and drink plenty of fluids. Adding humidity to the air in your home may also help to relieve your child's symptoms.  In some cases, influenza goes away on its own. Severe influenza or complications from influenza may be treated in a hospital.  Follow these instructions at home:  Medicines   · Give your child over-the-counter and prescription medicines only as told by your child's health care provider.  · Do not give your child aspirin because of the association with Reye syndrome.  General instructions     · Use a cool mist humidifier to add humidity to the air in your child's room. This can make it easier for your child to breathe.  · Have your child:  ¨ Rest as needed.  ¨ Drink enough fluid to keep his or her urine clear or pale yellow.  ¨ Cover his or her mouth and nose when coughing or sneezing.  ¨ Wash his or her hands with soap and water often, especially after coughing or sneezing. If soap and water are not available, have your child use hand . You should wash or sanitize your hands often as well.  · Keep your child home from work, school, or  as told by your child's health care provider. Unless your child is visiting a health care provider, it is best to keep your child home until his or her fever has been gone for 24 hours after without the use of medicine.  · Clear mucus from your young child's nose, if needed, by gentle suction with a bulb syringe.  · Keep all follow-up visits as told by your child's health care provider. This is important.  How is this prevented?  · Having your child get an  annual flu shot is the best way to prevent your child from getting the flu.  ¨ An annual flu shot is recommended for every child who is 6 months or older. Different shots are available for different age groups.  ¨ Your child may get the flu shot in late summer, fall, or winter. If your child needs two doses of the vaccine, it is best to get the first shot done as early as possible. Ask your child's health care provider when your child should get the flu shot.  · Have your child wash his or her hands often or use hand  often if soap and water are not available.  · Have your child avoid contact with people who are sick during cold and flu season.  · Make sure your child is eating a healthy diet, getting plenty of rest, drinking plenty of fluids, and exercising regularly.  Contact a health care provider if:  · Your child develops new symptoms.  · Your child has:  ¨ Ear pain. In young children and babies, this may cause crying and waking at night.  ¨ Chest pain.  ¨ Diarrhea.  ¨ A fever.  · Your child's cough gets worse.  · Your child produces more mucus.  · Your child feels nauseous.  · Your child vomits.  Get help right away if:  · Your child develops difficulty breathing or starts breathing quickly.  · Your child's skin or nails turn blue or purple.  · Your child is not drinking enough fluids.  · Your child will not wake up or interact with you.  · Your child develops a sudden headache.  · Your child cannot stop vomiting.  · Your child has severe pain or stiffness in his or her neck.  · Your child who is younger than 3 months has a temperature of 100°F (38°C) or higher.  This information is not intended to replace advice given to you by your health care provider. Make sure you discuss any questions you have with your health care provider.  Document Released: 12/18/2006 Document Revised: 05/25/2017 Document Reviewed: 10/11/2016  [employer] Interactive Patient Education © 2017 [employer]  Sinusitis,  Adult  Sinusitis is soreness and inflammation of your sinuses. Sinuses are hollow spaces in the bones around your face. Your sinuses are located:  · Around your eyes.  · In the middle of your forehead.  · Behind your nose.  · In your cheekbones.  Your sinuses and nasal passages are lined with a stringy fluid (mucus). Mucus normally drains out of your sinuses. When your nasal tissues become inflamed or swollen, the mucus can become trapped or blocked so air cannot flow through your sinuses. This allows bacteria, viruses, and funguses to grow, which leads to infection.  Sinusitis can develop quickly and last for 7?10 days (acute) or for more than 12 weeks (chronic). Sinusitis often develops after a cold.  What are the causes?  This condition is caused by anything that creates swelling in the sinuses or stops mucus from draining, including:  · Allergies.  · Asthma.  · Bacterial or viral infection.  · Abnormally shaped bones between the nasal passages.  · Nasal growths that contain mucus (nasal polyps).  · Narrow sinus openings.  · Pollutants, such as chemicals or irritants in the air.  · A foreign object stuck in the nose.  · A fungal infection. This is rare.  What increases the risk?  The following factors may make you more likely to develop this condition:  · Having allergies or asthma.  · Having had a recent cold or respiratory tract infection.  · Having structural deformities or blockages in your nose or sinuses.  · Having a weak immune system.  · Doing a lot of swimming or diving.  · Overusing nasal sprays.  · Smoking.  What are the signs or symptoms?  The main symptoms of this condition are pain and a feeling of pressure around the affected sinuses. Other symptoms include:  · Upper toothache.  · Earache.  · Headache.  · Bad breath.  · Decreased sense of smell and taste.  · A cough that may get worse at night.  · Fatigue.  · Fever.  · Thick drainage from your nose. The drainage is often green and it may contain  pus (purulent).  · Stuffy nose or congestion.  · Postnasal drip. This is when extra mucus collects in the throat or back of the nose.  · Swelling and warmth over the affected sinuses.  · Sore throat.  · Sensitivity to light.  How is this diagnosed?  This condition is diagnosed based on symptoms, a medical history, and a physical exam. To find out if your condition is acute or chronic, your health care provider may:  · Look in your nose for signs of nasal polyps.  · Tap over the affected sinus to check for signs of infection.  · View the inside of your sinuses using an imaging device that has a light attached (endoscope).  If your health care provider suspects that you have chronic sinusitis, you may also:  · Be tested for allergies.  · Have a sample of mucus taken from your nose (nasal culture) and checked for bacteria.  · Have a mucus sample examined to see if your sinusitis is related to an allergy.  If your sinusitis does not respond to treatment and it lasts longer than 8 weeks, you may have an MRI or CT scan to check your sinuses. These scans also help to determine how severe your infection is.  In rare cases, a bone biopsy may be done to rule out more serious types of fungal sinus disease.  How is this treated?  Treatment for sinusitis depends on the cause and whether your condition is chronic or acute. If a virus is causing your sinusitis, your symptoms will go away on their own within 10 days. You may be given medicines to relieve your symptoms, including:  · Topical nasal decongestants. They shrink swollen nasal passages and let mucus drain from your sinuses.  · Antihistamines. These drugs block inflammation that is triggered by allergies. This can help to ease swelling in your nose and sinuses.  · Topical nasal corticosteroids. These are nasal sprays that ease inflammation and swelling in your nose and sinuses.  · Nasal saline washes. These rinses can help to get rid of thick mucus in your nose.  If your  condition is caused by bacteria, you will be given an antibiotic medicine. If your condition is caused by a fungus, you will be given an antifungal medicine.  Surgery may be needed to correct underlying conditions, such as narrow nasal passages. Surgery may also be needed to remove polyps.  Follow these instructions at home:  Medicines   · Take, use, or apply over-the-counter and prescription medicines only as told by your health care provider. These may include nasal sprays.  · If you were prescribed an antibiotic medicine, take it as told by your health care provider. Do not stop taking the antibiotic even if you start to feel better.  Hydrate and Humidify   · Drink enough water to keep your urine clear or pale yellow. Staying hydrated will help to thin your mucus.  · Use a cool mist humidifier to keep the humidity level in your home above 50%.  · Inhale steam for 10-15 minutes, 3-4 times a day or as told by your health care provider. You can do this in the bathroom while a hot shower is running.  · Limit your exposure to cool or dry air.  Rest   · Rest as much as possible.  · Sleep with your head raised (elevated).  · Make sure to get enough sleep each night.  General instructions   · Apply a warm, moist washcloth to your face 3-4 times a day or as told by your health care provider. This will help with discomfort.  · Wash your hands often with soap and water to reduce your exposure to viruses and other germs. If soap and water are not available, use hand .  · Do not smoke. Avoid being around people who are smoking (secondhand smoke).  · Keep all follow-up visits as told by your health care provider. This is important.  Contact a health care provider if:  · You have a fever.  · Your symptoms get worse.  · Your symptoms do not improve within 10 days.  Get help right away if:  · You have a severe headache.  · You have persistent vomiting.  · You have pain or swelling around your face or eyes.  · You have  vision problems.  · You develop confusion.  · Your neck is stiff.  · You have trouble breathing.  This information is not intended to replace advice given to you by your health care provider. Make sure you discuss any questions you have with your health care provider.  Document Released: 12/18/2006 Document Revised: 08/13/2017 Document Reviewed: 10/12/2016  [employer] Interactive Patient Education © 2017 [employer]        Return if symptoms worsen or fail to improve with urgent care/ER.   0

## 2018-03-26 ENCOUNTER — APPOINTMENT (OUTPATIENT)
Dept: ELECTROPHYSIOLOGY | Facility: CLINIC | Age: 78
End: 2018-03-26
Payer: MEDICARE

## 2018-03-26 PROCEDURE — 93298 REM INTERROG DEV EVAL SCRMS: CPT

## 2018-03-26 PROCEDURE — 93299: CPT

## 2018-04-27 ENCOUNTER — APPOINTMENT (OUTPATIENT)
Dept: ELECTROPHYSIOLOGY | Facility: CLINIC | Age: 78
End: 2018-04-27
Payer: MEDICARE

## 2018-04-27 PROCEDURE — 93298 REM INTERROG DEV EVAL SCRMS: CPT

## 2018-04-27 PROCEDURE — 93299: CPT

## 2018-04-30 ENCOUNTER — MEDICATION RENEWAL (OUTPATIENT)
Age: 78
End: 2018-04-30

## 2018-05-01 ENCOUNTER — MEDICATION RENEWAL (OUTPATIENT)
Age: 78
End: 2018-05-01

## 2018-05-29 ENCOUNTER — APPOINTMENT (OUTPATIENT)
Dept: ELECTROPHYSIOLOGY | Facility: CLINIC | Age: 78
End: 2018-05-29
Payer: MEDICARE

## 2018-05-29 PROCEDURE — 93298 REM INTERROG DEV EVAL SCRMS: CPT

## 2018-05-29 PROCEDURE — 93299: CPT

## 2018-06-29 ENCOUNTER — APPOINTMENT (OUTPATIENT)
Dept: ELECTROPHYSIOLOGY | Facility: CLINIC | Age: 78
End: 2018-06-29
Payer: MEDICARE

## 2018-06-29 PROCEDURE — 93298 REM INTERROG DEV EVAL SCRMS: CPT

## 2018-06-29 PROCEDURE — 93299: CPT

## 2018-07-05 ENCOUNTER — APPOINTMENT (OUTPATIENT)
Dept: CARDIOLOGY | Facility: CLINIC | Age: 78
End: 2018-07-05

## 2018-07-11 ENCOUNTER — RX RENEWAL (OUTPATIENT)
Age: 78
End: 2018-07-11

## 2018-07-16 ENCOUNTER — APPOINTMENT (OUTPATIENT)
Dept: CARDIOLOGY | Facility: CLINIC | Age: 78
End: 2018-07-16

## 2018-07-22 PROBLEM — R73.03 PREDIABETES: Status: RESOLVED | Noted: 2017-03-08 | Resolved: 2018-07-22

## 2018-07-22 PROBLEM — I87.2 PERIPHERAL VENOUS INSUFFICIENCY: Status: ACTIVE | Noted: 2017-03-08

## 2018-07-24 ENCOUNTER — MEDICATION RENEWAL (OUTPATIENT)
Age: 78
End: 2018-07-24

## 2018-07-24 NOTE — DIETITIAN INITIAL EVALUATION ADULT. - PERTINENT MEDS FT
"  I have personally taken the history and examined this patient and agree with the Fellow's note as stated above.    Has correctly redefined "dry wt" following BAV and Rx CHF  Doing well  Has plans to f/u Dr. Sadler.  " zithromax, albuterol, ultram, amiodarone, lipitor, cardizem, cozaar

## 2018-07-30 ENCOUNTER — APPOINTMENT (OUTPATIENT)
Dept: ELECTROPHYSIOLOGY | Facility: CLINIC | Age: 78
End: 2018-07-30
Payer: MEDICARE

## 2018-07-30 PROCEDURE — 93299: CPT

## 2018-07-30 PROCEDURE — 93298 REM INTERROG DEV EVAL SCRMS: CPT

## 2018-07-31 ENCOUNTER — RX RENEWAL (OUTPATIENT)
Age: 78
End: 2018-07-31

## 2018-08-30 ENCOUNTER — APPOINTMENT (OUTPATIENT)
Dept: CARDIOLOGY | Facility: CLINIC | Age: 78
End: 2018-08-30

## 2018-08-31 ENCOUNTER — APPOINTMENT (OUTPATIENT)
Dept: ELECTROPHYSIOLOGY | Facility: CLINIC | Age: 78
End: 2018-08-31
Payer: MEDICARE

## 2018-08-31 PROCEDURE — 93298 REM INTERROG DEV EVAL SCRMS: CPT

## 2018-08-31 PROCEDURE — 93299: CPT

## 2018-10-01 ENCOUNTER — APPOINTMENT (OUTPATIENT)
Dept: ELECTROPHYSIOLOGY | Facility: CLINIC | Age: 78
End: 2018-10-01
Payer: MEDICARE

## 2018-10-01 PROCEDURE — 93299: CPT

## 2018-10-01 PROCEDURE — 93298 REM INTERROG DEV EVAL SCRMS: CPT

## 2018-10-11 ENCOUNTER — APPOINTMENT (OUTPATIENT)
Dept: PULMONOLOGY | Facility: CLINIC | Age: 78
End: 2018-10-11

## 2018-10-22 ENCOUNTER — APPOINTMENT (OUTPATIENT)
Dept: CARDIOLOGY | Facility: CLINIC | Age: 78
End: 2018-10-22

## 2018-10-26 ENCOUNTER — MEDICATION RENEWAL (OUTPATIENT)
Age: 78
End: 2018-10-26

## 2018-10-26 ENCOUNTER — RX RENEWAL (OUTPATIENT)
Age: 78
End: 2018-10-26

## 2018-11-02 ENCOUNTER — APPOINTMENT (OUTPATIENT)
Dept: ELECTROPHYSIOLOGY | Facility: CLINIC | Age: 78
End: 2018-11-02
Payer: MEDICARE

## 2018-11-02 PROCEDURE — 93298 REM INTERROG DEV EVAL SCRMS: CPT

## 2018-11-02 PROCEDURE — 93299: CPT

## 2018-11-12 ENCOUNTER — APPOINTMENT (OUTPATIENT)
Dept: CARDIOLOGY | Facility: CLINIC | Age: 78
End: 2018-11-12
Payer: MEDICARE

## 2018-11-12 VITALS
OXYGEN SATURATION: 96 % | RESPIRATION RATE: 18 BRPM | HEIGHT: 68 IN | BODY MASS INDEX: 36.83 KG/M2 | DIASTOLIC BLOOD PRESSURE: 73 MMHG | WEIGHT: 243 LBS | SYSTOLIC BLOOD PRESSURE: 156 MMHG | HEART RATE: 66 BPM

## 2018-11-12 VITALS — SYSTOLIC BLOOD PRESSURE: 140 MMHG | DIASTOLIC BLOOD PRESSURE: 70 MMHG

## 2018-11-12 PROCEDURE — 93000 ELECTROCARDIOGRAM COMPLETE: CPT

## 2018-11-12 PROCEDURE — 99214 OFFICE O/P EST MOD 30 MIN: CPT

## 2018-11-12 RX ORDER — PANTOPRAZOLE 40 MG/1
40 TABLET, DELAYED RELEASE ORAL
Refills: 1 | Status: ACTIVE | COMMUNITY

## 2018-11-12 RX ORDER — ATORVASTATIN CALCIUM 40 MG/1
40 TABLET, FILM COATED ORAL
Qty: 90 | Refills: 1 | Status: DISCONTINUED | COMMUNITY
End: 2018-11-12

## 2018-11-12 RX ORDER — DIPHENOXYLATE HYDROCHLORIDE AND ATROPINE SULFATE 2.5; .025 MG/1; MG/1
2.5-0.025 TABLET ORAL
Qty: 30 | Refills: 0 | Status: DISCONTINUED | COMMUNITY
Start: 2017-03-25 | End: 2018-11-12

## 2018-11-12 RX ORDER — MULTIVIT-MIN/FOLIC/VIT K/LYCOP 400-300MCG
25 MCG TABLET ORAL DAILY
Refills: 0 | Status: ACTIVE | COMMUNITY

## 2018-11-12 RX ORDER — TRAMADOL HYDROCHLORIDE 50 MG/1
50 TABLET, COATED ORAL
Qty: 60 | Refills: 0 | Status: ACTIVE | COMMUNITY
Start: 2017-07-02

## 2018-11-12 RX ORDER — WARFARIN 5 MG/1
5 TABLET ORAL DAILY
Refills: 0 | Status: DISCONTINUED | COMMUNITY
End: 2018-11-12

## 2018-11-12 RX ORDER — ALPRAZOLAM 0.25 MG/1
0.25 TABLET, ORALLY DISINTEGRATING ORAL 3 TIMES DAILY
Refills: 0 | Status: ACTIVE | COMMUNITY

## 2018-11-12 RX ORDER — PANTOPRAZOLE 40 MG/1
40 TABLET, DELAYED RELEASE ORAL TWICE DAILY
Qty: 180 | Refills: 3 | Status: DISCONTINUED | COMMUNITY
End: 2018-11-12

## 2018-11-12 RX ORDER — NYSTATIN 100000 U/G
100000 OINTMENT TOPICAL 3 TIMES DAILY
Refills: 0 | Status: ACTIVE | COMMUNITY

## 2018-11-12 NOTE — REASON FOR VISIT
[Follow-Up - Clinic] : a clinic follow-up of [Atrial Fibrillation] : atrial fibrillation [Dyspnea] : dyspnea [Hyperlipidemia] : hyperlipidemia [Hypertension] : hypertension [Palpitations] : palpitations

## 2018-11-18 ENCOUNTER — NON-APPOINTMENT (OUTPATIENT)
Age: 78
End: 2018-11-18

## 2018-11-18 NOTE — CARDIOLOGY SUMMARY
[LVEF ___%] : LVEF [unfilled]% [Enlarged] : enlarged LA size [Mild] : mild mitral regurgitation [___] : [unfilled] [___] : [unfilled]

## 2018-11-20 NOTE — DISCUSSION/SUMMARY
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 Reports of feeling good. Currently on 3 mg of warfarin, INR is being monitored by Dr. Tomlinson - Hematologist. Good compliance with use of CPAP device. Has shortness of breath with mild exertion - baseline. Denies chest pain, shortness of breath at rest, fatigue, palpitations, syncope or near syncope, orthopnea and PND. No bleeding episodes from AC. Uses cane at home and wheelchair for  long distances. No leg edema. Will see her new PCP next week with lab works. Cardiac catheterization last 6/29/2017 demonstrated 70% stenosis of D1 - on medical management for now. \par \par A/P:\par  \par 1. BP to goal - still slightly elevated. On Terazosin 2 mg QD at HS.  Continue losartan 100 mg QD,\par Cardizem  mg QD, and furosemide. Decrease intake of foods high in salt. Will increase Terazosin to 4 mg QHS. BP recheck in the office in 1-2 weeks\par 2. PAF - no events on ILR remote monitoring since 5/2018- on amiodarone\par 3. History of multiple DVT - on warfarin, no bleeding episodes\par 4. CAD - no chest pain\par 5. HLD - on atorvastatin\par 6. Labs next week through her PMD, will request copy of results.\par 7. will f/u with Dr. Sr in 6 months or sooner if needed\par \par Terell Memoracion, NP

## 2018-11-20 NOTE — HISTORY OF PRESENT ILLNESS
[FreeTextEntry1] : This is a 76-year-old female with hypertension, hyperlipidemia, atrial fibrillation, TIM, prior external cardioversion and diabetes. She had multiple DVTs and is on life long warfarin. \par Last month she was at the hospital with palpitation, lightheadedness and sob. She was lightheaded. Her /116. She can not  walk when she has palpitation. She has a loop recorder. She went to [facility] for 3 weeks and was discharged. She feels lightheaded at times and has sent notification to us of her symptoms. Review of loop recorder does not show significant findings. there is no AFib. Due to low pulse she takes only one Cardizem every other day rather than two as prescribed. \par \par CT head at [facility] was negative for acute CVA. \par \par Review of records: \par 1. Cardiac cath from 2010: Normal coronaries. \par 2. Carotid Duplex - 3/28/2017 - Mild atherosclerosis with no evidence of hemodynamically significant stenosis bilaterally. Retrograde flow noted in the left and right vertebral arteries, suggestive of bilateral subclavian steal.\par \par 4/24/2017\par Pt is here for f/u and review of diagnostic study. She had a CTA chest to R/O subclavian artery stenosis. Left subclavian artery was normal, while the right could not be visualized secondary to artifact from dense contrast in the adjacent SVC/right innominate vein. She still reports of occasional lightheadedness. She denies chest pain, shortness of breath, palpitations, syncope or near syncope, orthopnea and PND.\par \par 7/21/2017\par Here for f/u. Still getting iron infusion. Lost 4 lbs from previous visit. Denies chest pain, shortness of breath at rest, but gets shortness of breath with exertion especially when the weather is hot. Cardiac catheterization last 6/29/2017 demonstrated 70% stenosis of D1, medical management for now. \par \par 11/12/2018\par Pt is here for routine f/u. Reports of feeling good. Currently on 3 mg of warfarin, INR is being monitored by Dr. Tomlinson - Hematologist. Good compliance with use of CPAP device. Has shortness of breath with mild exertion - baseline. Denies chest pain, shortness of breath at rest, fatigue, palpitations, syncope or near syncope, orthopnea and PND. No bleeding episodes from AC. Uses cane at home and wheelchair for  long distances. No leg edema. Will see her new PCP next week with lab works.

## 2018-11-20 NOTE — PHYSICAL EXAM
[Well Groomed] : well groomed [No Deformities] : no deformities [General Appearance - In No Acute Distress] : no acute distress [Normal Conjunctiva] : the conjunctiva exhibited no abnormalities [Normal Oral Mucosa] : normal oral mucosa [No Oral Pallor] : no oral pallor [Normal Jugular Venous A Waves Present] : normal jugular venous A waves present [Normal Jugular Venous V Waves Present] : normal jugular venous V waves present [Respiration, Rhythm And Depth] : normal respiratory rhythm and effort [Auscultation Breath Sounds / Voice Sounds] : lungs were clear to auscultation bilaterally [Heart Rate And Rhythm] : heart rate and rhythm were normal [Heart Sounds] : normal S1 and S2 [Bowel Sounds] : normal bowel sounds [Abdomen Soft] : soft [Nail Clubbing] : no clubbing of the fingernails [Cyanosis, Localized] : no localized cyanosis [Skin Color & Pigmentation] : normal skin color and pigmentation [Skin Turgor] : normal skin turgor [Oriented To Time, Place, And Person] : oriented to person, place, and time [Impaired Insight] : insight and judgment were intact [Edema] : no peripheral edema present [FreeTextEntry1] : walks with a walker

## 2018-11-20 NOTE — REVIEW OF SYSTEMS
[Feeling Fatigued] : feeling fatigued [Eyeglasses] : currently wearing eyeglasses [Incontinence] : incontinence [Joint Pain] : joint pain [Dyspnea on exertion] : dyspnea during exertion [Headache] : no headache [Recent Weight Gain (___ Lbs)] : no recent weight gain [Chills] : no chills [Recent Weight Loss (___ Lbs)] : no recent weight loss [Earache] : no earache [Mouth Sores] : no mouth sores [Shortness Of Breath] : no shortness of breath [Chest  Pressure] : no chest pressure [Chest Pain] : no chest pain [Lower Ext Edema] : no extremity edema [Leg Claudication] : no intermittent leg claudication [Palpitations] : no palpitations [Cough] : no cough [Wheezing] : no wheezing [Abdominal Pain] : no abdominal pain [Nausea] : no nausea [Heartburn] : no heartburn [Dysuria] : no dysuria [Pelvic Pain] : no pelvic pain [Muscle Cramps] : no muscle cramps [Skin: A Rash] : no rash: [Itching] : no itching [Dizziness] : no dizziness [Convulsions] : no convulsions [Confusion] : no confusion was observed [Anxiety] : no anxiety [Excessive Thirst] : no polydipsia [Easy Bleeding] : no tendency for easy bleeding [Swollen Glands] : no swollen glands [Easy Bruising] : no tendency for easy bruising

## 2018-12-03 ENCOUNTER — APPOINTMENT (OUTPATIENT)
Dept: ELECTROPHYSIOLOGY | Facility: CLINIC | Age: 78
End: 2018-12-03
Payer: MEDICARE

## 2018-12-03 PROCEDURE — 93298 REM INTERROG DEV EVAL SCRMS: CPT

## 2018-12-03 PROCEDURE — 93299: CPT

## 2019-01-04 ENCOUNTER — RX RENEWAL (OUTPATIENT)
Age: 79
End: 2019-01-04

## 2019-01-04 ENCOUNTER — APPOINTMENT (OUTPATIENT)
Dept: ELECTROPHYSIOLOGY | Facility: CLINIC | Age: 79
End: 2019-01-04
Payer: MEDICARE

## 2019-01-04 PROCEDURE — 93299: CPT

## 2019-01-04 PROCEDURE — 93298 REM INTERROG DEV EVAL SCRMS: CPT

## 2019-02-11 ENCOUNTER — APPOINTMENT (OUTPATIENT)
Dept: ELECTROPHYSIOLOGY | Facility: CLINIC | Age: 79
End: 2019-02-11
Payer: MEDICARE

## 2019-02-11 PROCEDURE — 93299: CPT

## 2019-02-11 PROCEDURE — 93298 REM INTERROG DEV EVAL SCRMS: CPT

## 2019-03-15 ENCOUNTER — APPOINTMENT (OUTPATIENT)
Dept: ELECTROPHYSIOLOGY | Facility: CLINIC | Age: 79
End: 2019-03-15
Payer: MEDICARE

## 2019-03-15 PROCEDURE — 93298 REM INTERROG DEV EVAL SCRMS: CPT

## 2019-03-15 PROCEDURE — 93299: CPT

## 2019-03-18 ENCOUNTER — APPOINTMENT (OUTPATIENT)
Dept: ELECTROPHYSIOLOGY | Facility: CLINIC | Age: 79
End: 2019-03-18

## 2019-04-15 ENCOUNTER — APPOINTMENT (OUTPATIENT)
Dept: ELECTROPHYSIOLOGY | Facility: CLINIC | Age: 79
End: 2019-04-15
Payer: MEDICARE

## 2019-04-15 PROCEDURE — 93299: CPT

## 2019-04-15 PROCEDURE — 93298 REM INTERROG DEV EVAL SCRMS: CPT

## 2019-05-16 NOTE — H&P ADULT - ASSESSMENT
[facility]  [facility]  [address]  [place] [address]      Juan Carlos Beckwith :1982 MRN:[id number]    2019 Time Session Began: 1500  Time Session Ended: 1600    Session Type:60 Minute Therapy (54387)    Others Present: no    Intervention: Behavioral, Cognitive, adaptive disclosure    Suicide/Homicide/Violence Ideation: No    If Yes, explain:     Current Outpatient Medications   Medication Sig   • ibuprofen (MOTRIN) 600 MG tablet Take 600 mg by mouth every 6 hours.     No current facility-administered medications for this visit.        Change in Medication(s) Reported: No  If Yes, explain:     Patient/Family Education Provided: Yes  Patient/Family Displays Understanding: Yes    If No, explain:     Chief complaint in patient's own words: \"I avoid a lot of things due to fear.\"    Progress Note containing chief complaint and symptoms/problems related to the complaint:    (Data/Action/Response/Plan)    D:  Client presented on time. CT reported worse sleep over past week including difficulty falling asleep. CT reported thinking about session intended for trauma disclosure with some anxiety for the session. CT stated he has never talked about the events before.      A: Writer guided ct through trauma disclosure of event with IED blast in [place]. Ct discussed thoughts, feelings, and sensations through his account. Lead ct through reflection about discussion. Educated ct about fight/flight/freeze response and how people usually do not get to choose which way the body responds to traumatic events.      Previous Assessment Measures  MARIIA-7 (Anxiety): 20  PHQ-9 (Depression): 18  PCL-5 (PTSD): 47  MIES (Moral Injury Event Scale): 38  Q-LES-Q-SF (Quality of Life Event and Satisfaction): 37%     R: The client was engaged throughout the session.  Ct appeared anxious as evident by shaking leg throughout session and tired as evident by slow blinking and droopy  eyes; had difficulty sitting still. Ct expressed emotions when recounting story with appropriate affect. CT reported feeling fear about the incident, with secondary emotion of shame. CT discussed ways fear affects his current life including urge to isolate. CT agreed to face his fears more by attending more family events and leaving his house more.    Treatment plan- Address trauma with Adaptive disclosure, relaxation training, and mindfulness skills to practice diaphragmatic breathing, journal/write about previous losses/moral injuries, develop emotional tolerance (guilt, shame, fear, sadness), and increasing social activities.     P: Follow up in 1 week with writer. Will guide ct through disclosure of IED blast event to moral authority figure.    Need for Community Resources Assessed: Yes    Resources Needed: Yes    If Yes, what resources:  support group    Diagnosis: F43.10 PTSD (post-traumatic stress disorder)  (primary encounter diagnosis)    Treatment Plan: Unchanged    Discharge Plan: N/A    Next Appointment: 1 week      Braulio David PSYD   77 yo female with pmh of afib, recurrent lower extremity dvt's, rheumatic fever,  pre diabetes, htn, fibromyalgia, hiatal hernia, GAVE, gerd, ckd,, thyroid nodules, neuropathy to lower extremities, anemia presenting with anemia. Awaiting cardiac cath for abnormal stress test.

## 2019-05-17 ENCOUNTER — APPOINTMENT (OUTPATIENT)
Dept: ELECTROPHYSIOLOGY | Facility: CLINIC | Age: 79
End: 2019-05-17
Payer: MEDICARE

## 2019-05-17 PROCEDURE — 93298 REM INTERROG DEV EVAL SCRMS: CPT

## 2019-05-17 PROCEDURE — 93299: CPT

## 2019-06-17 ENCOUNTER — APPOINTMENT (OUTPATIENT)
Dept: ELECTROPHYSIOLOGY | Facility: CLINIC | Age: 79
End: 2019-06-17
Payer: MEDICARE

## 2019-06-17 ENCOUNTER — APPOINTMENT (OUTPATIENT)
Dept: CARDIOLOGY | Facility: CLINIC | Age: 79
End: 2019-06-17

## 2019-06-17 PROCEDURE — 93299: CPT

## 2019-06-17 PROCEDURE — 93298 REM INTERROG DEV EVAL SCRMS: CPT

## 2019-07-02 RX ORDER — DILTIAZEM HYDROCHLORIDE 360 MG/1
360 CAPSULE, COATED, EXTENDED RELEASE ORAL
Qty: 90 | Refills: 0 | Status: ACTIVE | COMMUNITY
Start: 2017-03-08 | End: 1900-01-01

## 2019-07-19 ENCOUNTER — APPOINTMENT (OUTPATIENT)
Dept: ELECTROPHYSIOLOGY | Facility: CLINIC | Age: 79
End: 2019-07-19
Payer: MEDICARE

## 2019-07-19 PROCEDURE — 93298 REM INTERROG DEV EVAL SCRMS: CPT

## 2019-07-19 PROCEDURE — 93299: CPT

## 2019-07-22 ENCOUNTER — MEDICATION RENEWAL (OUTPATIENT)
Age: 79
End: 2019-07-22

## 2019-07-24 ENCOUNTER — APPOINTMENT (OUTPATIENT)
Dept: CARDIOLOGY | Facility: CLINIC | Age: 79
End: 2019-07-24
Payer: MEDICARE

## 2019-07-24 VITALS
HEART RATE: 65 BPM | WEIGHT: 254 LBS | SYSTOLIC BLOOD PRESSURE: 170 MMHG | OXYGEN SATURATION: 99 % | HEIGHT: 68 IN | DIASTOLIC BLOOD PRESSURE: 90 MMHG | BODY MASS INDEX: 38.49 KG/M2

## 2019-07-24 VITALS — SYSTOLIC BLOOD PRESSURE: 160 MMHG | DIASTOLIC BLOOD PRESSURE: 80 MMHG

## 2019-07-24 PROCEDURE — 93000 ELECTROCARDIOGRAM COMPLETE: CPT

## 2019-07-24 PROCEDURE — 93306 TTE W/DOPPLER COMPLETE: CPT

## 2019-07-24 PROCEDURE — 99214 OFFICE O/P EST MOD 30 MIN: CPT

## 2019-07-26 ENCOUNTER — NON-APPOINTMENT (OUTPATIENT)
Age: 79
End: 2019-07-26

## 2019-07-29 NOTE — DIETITIAN INITIAL EVALUATION ADULT. - WEIGHT IN LBS
[id number] Quality 474: Zoster Vaccination Status: Shingrix Vaccination not Administered or Previously Received, Reason not Otherwise Specified Quality 110: Preventive Care And Screening: Influenza Immunization: Influenza Immunization Administered during Influenza season Detail Level: Detailed Quality 226: Preventive Care And Screening: Tobacco Use: Screening And Cessation Intervention: Patient screened for tobacco use and is an ex/non-smoker Quality 131: Pain Assessment And Follow-Up: Pain assessment NOT documented as being performed, documentation the patient is not eligible for a pain assessment using a standardized tool

## 2019-08-05 DIAGNOSIS — Z79.899 OTHER LONG TERM (CURRENT) DRUG THERAPY: ICD-10-CM

## 2019-08-19 ENCOUNTER — APPOINTMENT (OUTPATIENT)
Dept: ELECTROPHYSIOLOGY | Facility: CLINIC | Age: 79
End: 2019-08-19
Payer: MEDICARE

## 2019-08-19 PROCEDURE — 93298 REM INTERROG DEV EVAL SCRMS: CPT

## 2019-08-19 PROCEDURE — 93299: CPT

## 2019-09-20 ENCOUNTER — APPOINTMENT (OUTPATIENT)
Dept: ELECTROPHYSIOLOGY | Facility: CLINIC | Age: 79
End: 2019-09-20
Payer: MEDICARE

## 2019-09-20 PROCEDURE — 93298 REM INTERROG DEV EVAL SCRMS: CPT

## 2019-09-20 PROCEDURE — 93299: CPT

## 2019-10-21 ENCOUNTER — APPOINTMENT (OUTPATIENT)
Dept: ELECTROPHYSIOLOGY | Facility: CLINIC | Age: 79
End: 2019-10-21
Payer: MEDICARE

## 2019-10-21 PROCEDURE — 93299: CPT

## 2019-10-21 PROCEDURE — 93298 REM INTERROG DEV EVAL SCRMS: CPT

## 2019-11-22 ENCOUNTER — APPOINTMENT (OUTPATIENT)
Dept: ELECTROPHYSIOLOGY | Facility: CLINIC | Age: 79
End: 2019-11-22
Payer: MEDICARE

## 2019-11-22 PROCEDURE — 93299: CPT

## 2019-11-22 PROCEDURE — 93298 REM INTERROG DEV EVAL SCRMS: CPT

## 2019-12-23 ENCOUNTER — APPOINTMENT (OUTPATIENT)
Dept: ELECTROPHYSIOLOGY | Facility: CLINIC | Age: 79
End: 2019-12-23
Payer: MEDICARE

## 2019-12-23 PROCEDURE — 93299: CPT

## 2019-12-23 PROCEDURE — 93298 REM INTERROG DEV EVAL SCRMS: CPT

## 2020-01-24 ENCOUNTER — APPOINTMENT (OUTPATIENT)
Dept: ELECTROPHYSIOLOGY | Facility: CLINIC | Age: 80
End: 2020-01-24
Payer: MEDICARE

## 2020-01-24 PROCEDURE — G2066: CPT

## 2020-01-24 PROCEDURE — 93298 REM INTERROG DEV EVAL SCRMS: CPT

## 2020-02-24 ENCOUNTER — APPOINTMENT (OUTPATIENT)
Dept: ELECTROPHYSIOLOGY | Facility: CLINIC | Age: 80
End: 2020-02-24
Payer: MEDICARE

## 2020-02-24 PROCEDURE — G2066: CPT

## 2020-02-24 PROCEDURE — 93298 REM INTERROG DEV EVAL SCRMS: CPT

## 2020-03-31 ENCOUNTER — APPOINTMENT (OUTPATIENT)
Dept: ELECTROPHYSIOLOGY | Facility: CLINIC | Age: 80
End: 2020-03-31
Payer: MEDICARE

## 2020-03-31 PROCEDURE — G2066: CPT

## 2020-03-31 PROCEDURE — 93298 REM INTERROG DEV EVAL SCRMS: CPT

## 2020-04-09 ENCOUNTER — APPOINTMENT (OUTPATIENT)
Dept: CARDIOLOGY | Facility: CLINIC | Age: 80
End: 2020-04-09

## 2020-05-04 ENCOUNTER — APPOINTMENT (OUTPATIENT)
Dept: ELECTROPHYSIOLOGY | Facility: CLINIC | Age: 80
End: 2020-05-04
Payer: MEDICARE

## 2020-05-04 PROCEDURE — G2066: CPT

## 2020-05-04 PROCEDURE — 93298 REM INTERROG DEV EVAL SCRMS: CPT

## 2020-05-08 NOTE — ED ADULT NURSE NOTE - NS ED NURSE LEVEL OF CONSCIOUSNESS ORIENTATION
Patient prefers to have Apollo present for discharge teaching.  
Oriented - self; Oriented - place; Oriented - time

## 2020-06-09 ENCOUNTER — APPOINTMENT (OUTPATIENT)
Dept: ELECTROPHYSIOLOGY | Facility: CLINIC | Age: 80
End: 2020-06-09
Payer: MEDICARE

## 2020-06-09 PROCEDURE — 93298 REM INTERROG DEV EVAL SCRMS: CPT

## 2020-06-09 PROCEDURE — G2066: CPT

## 2020-07-14 ENCOUNTER — APPOINTMENT (OUTPATIENT)
Dept: ELECTROPHYSIOLOGY | Facility: CLINIC | Age: 80
End: 2020-07-14
Payer: MEDICARE

## 2020-07-14 PROCEDURE — 93298 REM INTERROG DEV EVAL SCRMS: CPT

## 2020-07-14 PROCEDURE — G2066: CPT

## 2020-08-18 ENCOUNTER — APPOINTMENT (OUTPATIENT)
Dept: ELECTROPHYSIOLOGY | Facility: CLINIC | Age: 80
End: 2020-08-18
Payer: MEDICARE

## 2020-08-18 PROCEDURE — 93298 REM INTERROG DEV EVAL SCRMS: CPT

## 2020-08-18 PROCEDURE — G2066: CPT

## 2020-09-22 ENCOUNTER — APPOINTMENT (OUTPATIENT)
Dept: ELECTROPHYSIOLOGY | Facility: CLINIC | Age: 80
End: 2020-09-22
Payer: MEDICARE

## 2020-09-22 ENCOUNTER — NON-APPOINTMENT (OUTPATIENT)
Age: 80
End: 2020-09-22

## 2020-09-22 VITALS
TEMPERATURE: 97.3 F | SYSTOLIC BLOOD PRESSURE: 168 MMHG | BODY MASS INDEX: 37.59 KG/M2 | HEIGHT: 68 IN | WEIGHT: 248 LBS | HEART RATE: 72 BPM | DIASTOLIC BLOOD PRESSURE: 81 MMHG | OXYGEN SATURATION: 94 %

## 2020-09-22 DIAGNOSIS — Z79.899 OTHER LONG TERM (CURRENT) DRUG THERAPY: ICD-10-CM

## 2020-09-22 PROCEDURE — 99213 OFFICE O/P EST LOW 20 MIN: CPT

## 2020-09-22 PROCEDURE — 93000 ELECTROCARDIOGRAM COMPLETE: CPT | Mod: 59

## 2020-09-22 PROCEDURE — 93298 REM INTERROG DEV EVAL SCRMS: CPT

## 2020-09-22 PROCEDURE — G2066: CPT

## 2020-09-24 NOTE — PHYSICAL EXAM
[General Appearance - Well Developed] : well developed [Heart Rate And Rhythm] : heart rate and rhythm were normal [Heart Sounds] : normal S1 and S2 [Murmurs] : no murmurs present [Arterial Pulses Normal] : the arterial pulses were normal [] : no respiratory distress

## 2020-09-26 NOTE — HISTORY OF PRESENT ILLNESS
[de-identified] : 79 y/o F with PMH of CAD, PAF with ILR, HTN, HLD, chronic iron deficiency anemia, and multiple DVT - on lifelong warfarin, who reports for f/u. Last seen in office by cardiology 7/2019. \par \par Since last follow up she reports she has felt well, maintained on Amiodarone 100mg daily. Loop has revealed very low burden PAF- one episode in the past year, asymptomatic, rate controlled. Loop now at EOS.\par

## 2020-09-26 NOTE — DISCUSSION/SUMMARY
[FreeTextEntry1] : 77 y/o F with PMH of CAD, PAF with ILR, HTN, HLD, chronic iron deficiency anemia, and multiple DVT - on lifelong warfarin, who reports for f/u. Last seen in office by cardiology 7/2019. \par \par Since last follow up she reports she has felt well, maintained on Amiodarone 100mg daily. Loop has revealed very low burden PAF- one episode in the past year, asymptomatic, rate controlled. Loop now at EOS.\par \par Recommendation:\par \par -She is requesting ILR explant, to be arranged at [facility] with Dr. Macedo. We discussed monitoring for toxicities while on low dose amiodarone 100mg daily. To arrange TFT, LFT, annual opthalmology, PFT. \par - To arrange cardiology f/u with Dr. Sr, >1 year since last f/u \par -Bp elevated in office today, reports well controlled on home monitoring- recommended keeping home log to report to Dr Sr during office visit \par \par Harriet Sumner ANP-C \par

## 2020-09-26 NOTE — END OF VISIT
[FreeTextEntry3] : I have personally seen, examined, and participated in the care of this patient. I have reviewed all pertinent clinical information, including history, physical exam, plan, and the PA/NP's note and agree except as noted below.\par \par Plan for ILR explant as it is at EOS. She will remain on amiodarone as per her request. No plan for ILR reimplantation.\par \par Kike Macedo MD, FACC, RS\par Clinical Cardiac Electrophysiology

## 2020-10-05 ENCOUNTER — APPOINTMENT (OUTPATIENT)
Dept: CARDIOLOGY | Facility: CLINIC | Age: 80
End: 2020-10-05

## 2020-10-23 ENCOUNTER — RX RENEWAL (OUTPATIENT)
Age: 80
End: 2020-10-23

## 2020-10-27 ENCOUNTER — APPOINTMENT (OUTPATIENT)
Dept: ELECTROPHYSIOLOGY | Facility: CLINIC | Age: 80
End: 2020-10-27

## 2020-11-12 ENCOUNTER — APPOINTMENT (OUTPATIENT)
Dept: CARDIOLOGY | Facility: CLINIC | Age: 80
End: 2020-11-12

## 2020-12-01 ENCOUNTER — APPOINTMENT (OUTPATIENT)
Dept: ELECTROPHYSIOLOGY | Facility: CLINIC | Age: 80
End: 2020-12-01

## 2021-03-11 ENCOUNTER — APPOINTMENT (OUTPATIENT)
Dept: CARDIOLOGY | Facility: CLINIC | Age: 81
End: 2021-03-11
Payer: MEDICARE

## 2021-03-11 ENCOUNTER — NON-APPOINTMENT (OUTPATIENT)
Age: 81
End: 2021-03-11

## 2021-03-11 VITALS
HEART RATE: 68 BPM | TEMPERATURE: 97.9 F | RESPIRATION RATE: 17 BRPM | SYSTOLIC BLOOD PRESSURE: 151 MMHG | WEIGHT: 265 LBS | HEIGHT: 68 IN | DIASTOLIC BLOOD PRESSURE: 81 MMHG | OXYGEN SATURATION: 97 % | BODY MASS INDEX: 40.16 KG/M2

## 2021-03-11 VITALS — SYSTOLIC BLOOD PRESSURE: 150 MMHG | DIASTOLIC BLOOD PRESSURE: 70 MMHG

## 2021-03-11 DIAGNOSIS — E78.00 PURE HYPERCHOLESTEROLEMIA, UNSPECIFIED: ICD-10-CM

## 2021-03-11 PROCEDURE — 93000 ELECTROCARDIOGRAM COMPLETE: CPT

## 2021-03-11 PROCEDURE — 99214 OFFICE O/P EST MOD 30 MIN: CPT

## 2021-03-11 RX ORDER — WARFARIN 4 MG/1
4 TABLET ORAL
Refills: 0 | Status: ACTIVE | COMMUNITY

## 2021-03-11 RX ORDER — CHLORHEXIDINE GLUCONATE 4 %
325 (65 FE) LIQUID (ML) TOPICAL 3 TIMES DAILY
Refills: 0 | Status: ACTIVE | COMMUNITY
Start: 2020-09-22

## 2021-03-11 RX ORDER — NITROGLYCERIN 0.4 MG/1
0.4 TABLET SUBLINGUAL
Qty: 1 | Refills: 2 | Status: ACTIVE | COMMUNITY
Start: 2021-03-11 | End: 1900-01-01

## 2021-03-17 ENCOUNTER — NON-APPOINTMENT (OUTPATIENT)
Age: 81
End: 2021-03-17

## 2021-04-02 ENCOUNTER — RX CHANGE (OUTPATIENT)
Age: 81
End: 2021-04-02

## 2021-04-19 ENCOUNTER — RX RENEWAL (OUTPATIENT)
Age: 81
End: 2021-04-19

## 2021-06-17 ENCOUNTER — APPOINTMENT (OUTPATIENT)
Dept: CARDIOLOGY | Facility: CLINIC | Age: 81
End: 2021-06-17

## 2021-07-19 ENCOUNTER — RX RENEWAL (OUTPATIENT)
Age: 81
End: 2021-07-19

## 2021-12-13 ENCOUNTER — APPOINTMENT (OUTPATIENT)
Dept: CARDIOLOGY | Facility: CLINIC | Age: 81
End: 2021-12-13

## 2022-02-10 ENCOUNTER — RX CHANGE (OUTPATIENT)
Age: 82
End: 2022-02-10

## 2022-05-12 RX ORDER — TERAZOSIN 2 MG/1
2 CAPSULE ORAL
Qty: 60 | Refills: 0 | Status: ACTIVE | COMMUNITY
Start: 2017-07-21 | End: 1900-01-01

## 2022-05-16 ENCOUNTER — RX RENEWAL (OUTPATIENT)
Age: 82
End: 2022-05-16

## 2022-05-16 RX ORDER — LOSARTAN POTASSIUM 100 MG/1
100 TABLET, FILM COATED ORAL
Qty: 30 | Refills: 1 | Status: ACTIVE | COMMUNITY
Start: 2017-04-24 | End: 1900-01-01

## 2022-05-26 ENCOUNTER — APPOINTMENT (OUTPATIENT)
Dept: CARDIOLOGY | Facility: CLINIC | Age: 82
End: 2022-05-26

## 2022-06-06 ENCOUNTER — RX CHANGE (OUTPATIENT)
Age: 82
End: 2022-06-06

## 2022-06-10 ENCOUNTER — RX CHANGE (OUTPATIENT)
Age: 82
End: 2022-06-10

## 2022-06-13 ENCOUNTER — RX RENEWAL (OUTPATIENT)
Age: 82
End: 2022-06-13

## 2022-07-14 ENCOUNTER — NON-APPOINTMENT (OUTPATIENT)
Age: 82
End: 2022-07-14

## 2022-07-14 ENCOUNTER — APPOINTMENT (OUTPATIENT)
Dept: CARDIOLOGY | Facility: CLINIC | Age: 82
End: 2022-07-14

## 2022-07-14 VITALS
HEART RATE: 73 BPM | HEIGHT: 68 IN | SYSTOLIC BLOOD PRESSURE: 127 MMHG | TEMPERATURE: 98 F | BODY MASS INDEX: 34.4 KG/M2 | DIASTOLIC BLOOD PRESSURE: 75 MMHG | WEIGHT: 227 LBS | OXYGEN SATURATION: 97 %

## 2022-07-14 DIAGNOSIS — I25.10 ATHEROSCLEROTIC HEART DISEASE OF NATIVE CORONARY ARTERY W/OUT ANGINA PECTORIS: ICD-10-CM

## 2022-07-14 DIAGNOSIS — I10 ESSENTIAL (PRIMARY) HYPERTENSION: ICD-10-CM

## 2022-07-14 DIAGNOSIS — R00.2 PALPITATIONS: ICD-10-CM

## 2022-07-14 DIAGNOSIS — I48.0 PAROXYSMAL ATRIAL FIBRILLATION: ICD-10-CM

## 2022-07-14 PROCEDURE — 93000 ELECTROCARDIOGRAM COMPLETE: CPT

## 2022-07-14 PROCEDURE — 99214 OFFICE O/P EST MOD 30 MIN: CPT

## 2022-07-14 RX ORDER — FUROSEMIDE 20 MG/1
20 TABLET ORAL DAILY
Qty: 30 | Refills: 3 | Status: DISCONTINUED | COMMUNITY
End: 2022-07-14

## 2022-07-14 RX ORDER — HYDRALAZINE HYDROCHLORIDE 50 MG/1
50 TABLET ORAL TWICE DAILY
Qty: 60 | Refills: 5 | Status: DISCONTINUED | COMMUNITY
Start: 2021-03-11 | End: 2022-07-14

## 2022-07-14 RX ORDER — FUROSEMIDE 40 MG/1
40 TABLET ORAL
Qty: 90 | Refills: 0 | Status: ACTIVE | COMMUNITY
Start: 2022-05-16

## 2022-08-08 ENCOUNTER — RX CHANGE (OUTPATIENT)
Age: 82
End: 2022-08-08

## 2022-08-08 RX ORDER — PRAVASTATIN SODIUM 40 MG/1
40 TABLET ORAL
Qty: 90 | Refills: 3 | Status: ACTIVE | COMMUNITY
Start: 2019-08-05 | End: 1900-01-01

## 2022-10-05 NOTE — REVIEW OF SYSTEMS
Hydronephrosis [Headache] : no headache [Feeling Fatigued] : not feeling fatigued [Shortness Of Breath] : no shortness of breath [Dyspnea on exertion] : not dyspnea during exertion [Chest Pain] : no chest pain [Lower Ext Edema] : no extremity edema [Palpitations] : no palpitations [Dizziness] : no dizziness

## 2022-10-31 DIAGNOSIS — M25.562 PAIN IN RIGHT KNEE: ICD-10-CM

## 2022-10-31 DIAGNOSIS — M25.561 PAIN IN RIGHT KNEE: ICD-10-CM

## 2022-11-01 ENCOUNTER — APPOINTMENT (OUTPATIENT)
Dept: ORTHOPEDIC SURGERY | Facility: CLINIC | Age: 82
End: 2022-11-01

## 2022-11-01 VITALS
HEART RATE: 86 BPM | DIASTOLIC BLOOD PRESSURE: 75 MMHG | SYSTOLIC BLOOD PRESSURE: 127 MMHG | WEIGHT: 226 LBS | BODY MASS INDEX: 34.25 KG/M2 | HEIGHT: 68 IN

## 2022-11-01 PROCEDURE — 99204 OFFICE O/P NEW MOD 45 MIN: CPT | Mod: 25

## 2022-11-01 PROCEDURE — 73564 X-RAY EXAM KNEE 4 OR MORE: CPT | Mod: 50

## 2022-11-01 PROCEDURE — 20610 DRAIN/INJ JOINT/BURSA W/O US: CPT | Mod: 50

## 2022-11-01 NOTE — PROCEDURE
[de-identified] : Euflexxa # 1 Right knee\par Discussed at length with the patient the planned Euflexxa injection. The risks, benefits, convalescence and alternatives were reviewed. The possible side effects discussed included but were not limited to: pain, swelling, heat and redness. There symptoms are generally mild but if they are extensive then contact the office. Giving pain relievers by mouth such as NSAID´s or Tylenol can generally treat the reactions to Euflexxa. Rare cases of infection have been noted. Rash, hives and itching may occur post injection. If you have muscle pain or cramps, flushing and or swelling of the face, rapid heart beat, nausea, dizziness, fever, chills, headache, difficulty breathing, swelling in the arms or legs, or have a prickly feeling of your skin, contact a health care provider immediately.\par \par Following this discussion, the knee was prepped with betadine and under sterile condition the Euflexxa injection was performed with a 22 gauge needle. The needle was introduced into the joint, aspiration was performed to ensure intra-articular placement and the medication was injected. Upon withdrawal of the needle the site was cleaned with alcohol and a bandaid applied. The patient tolerated the injection well and there were no adverse effects. Post injection instructions included no strenuous activity for 24 hours, cryotherapy and if there are any adverse effects to contact the office.\par \par Euflexxa # 1 Left knee\par Discussed at length with the patient the planned Euflexxa injection. The risks, benefits, convalescence and alternatives were reviewed. The possible side effects discussed included but were not limited to: pain, swelling, heat and redness. There symptoms are generally mild but if they are extensive then contact the office. Giving pain relievers by mouth such as NSAID´s or Tylenol can generally treat the reactions to Euflexxa. Rare cases of infection have been noted. Rash, hives and itching may occur post injection. If you have muscle pain or cramps, flushing and or swelling of the face, rapid heart beat, nausea, dizziness, fever, chills, headache, difficulty breathing, swelling in the arms or legs, or have a prickly feeling of your skin, contact a health care provider immediately.\par \par Following this discussion, the knee was prepped with betadine and under sterile condition the Euflexxa injection was performed with a 22 gauge needle. The needle was introduced into the joint, aspiration was performed to ensure intra-articular placement and the medication was injected. Upon withdrawal of the needle the site was cleaned with alcohol and a bandaid applied. The patient tolerated the injection well and there were no adverse effects. Post injection instructions included no strenuous activity for 24 hours, cryotherapy and if there are any adverse effects to contact the office.\par

## 2022-11-01 NOTE — PHYSICAL EXAM
[de-identified] : GENERAL APPEARANCE: Well nourished and hydrated, pleasant, alert, and oriented x 3. Appears their stated age. \par HEENT: Normocephalic, extraocular eye motion intact. Nasal septum midline. Oral cavity clear. External auditory canal clear. \par RESPIRATORY: Breath sounds clear and audible in all lobes. No wheezing, No accessory muscle use.\par CARDIOVASCULAR: No apparent abnormalities.  Bilateral lymphedema of the lower extremity no varicosities. Pedal pulses are palpable.\par NEUROLOGIC: Sensation is normal, no muscle weakness in the upper or lower extremities.\par DERMATOLOGIC: No apparent skin lesions, moist, warm, no rash.\par SPINE: Cervical spine appears normal and moves freely; thoracic spine appears normal and moves freely; lumbosacral spine appears normal and moves freely, normal, nontender.\par MUSCULOSKELETAL: Hands, wrists, and elbows are normal and move freely, shoulders are normal and move freely. \par Psychiatric: Oriented to person, place, and time, insight and judgement were intact and the affect was normal. \par Musculoskeletal:. Left knee exam shows mild effusion, ROM is 0-1 15 degrees, no instability, no pain with Caden, medial lateral joint line tenderness. \par Right knee exam shows mild effusion, ROM is 5-1 05 degrees, 5 no instability, no no pain with Caden, medial lateral joint line tenderness. \par 5/5 motor strength in bilateral lower extremities. Sensory: Intact in bilateral lower extremities. DTRs: Biceps, brachioradialis, triceps, patellar, ankle and plantar 2+ and symmetric bilaterally. Pulses: dorsalis pedis, posterior tibial, femoral, popliteal, and radial 2+ and symmetric bilaterally. \par  [de-identified] : 4 views of bilateral knees obtained the office today show no acute fracture or dislocation.  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

## 2022-11-01 NOTE — HISTORY OF PRESENT ILLNESS
[Worsening] : worsening [7] : an average pain level of 7/10 [10] : a maximum pain level of 10/10 [Standing] : standing [Constant] : ~He/She~ states the symptoms seem to be constant [Direct Pressure] : worsened by direct pressure [Hip Movement] : worsened by hip movement [Sitting] : worsened by sitting [Walking] : worsened by walking [Knee Flexion] : worsened with knee flexion [Knee Extension] : worsened with knee extension [Ice] : relieved by ice [Opioid Analgesics] : relieved by opioid analgesics [de-identified] : 81-year-old female with past medical history of hypertension, A. fib on Coumadin, clotting disorder, iron deficiency anemia, hyperlipidemia, diabetes, venous insufficiency, Stroke (2017) now with balance difficutly presents office for initial evaluation of bilateral knee pain right worse than left.  The patient states that she has had knee pain for many years which she has been managing herself but over the past few months her right knee pain has become more severe.  She takes tramadol for her back which helps with her knee pain.  She also states that she wears compression stockings and wraps an Ace wrap under her knee to help with the pain and support.  She states that she has not been to physical therapy because she is homebound and it is difficult for her to get out of the house to make it to appointments, although she tries to perform at home exercises daily.  Her pain is exacerbated by any movement of the knee, weightbearing, sitting for long periods of time, rising from a seated position, lowering to a seated position.  She uses a rolling walker with a seat outside of the house and a cane when inside the house.  She would like to avoid surgery if possible.  Denies any recent injuries or falls, locking catching of the knees, numbness and tingling in the lower extremities.\par \par \par Denies a history of smoking, drug and alcohol use.

## 2022-11-01 NOTE — DISCUSSION/SUMMARY
[Medication Risks Reviewed] : Medication risks reviewed [Surgical risks reviewed] : Surgical risks reviewed [de-identified] : Patient is a 81-year-old female with bilateral knee osteoarthritis presenting today for initial evaluation.  She is not a try conservative treatment I think that is warranted at this time.  She would like to try gel injections and I gave her the first round of Euflexxa into the bilateral knees which she tolerated well.  I have strongly urged physical therapy strengthening and balance training I given her prescription for that.  She should take Tylenol as needed for the pain as she is unable to take NSAIDs.  I will see her back in 1 week for repeat injection.  All questions were asked and answered

## 2022-11-07 ENCOUNTER — APPOINTMENT (OUTPATIENT)
Dept: ORTHOPEDIC SURGERY | Facility: CLINIC | Age: 82
End: 2022-11-07

## 2022-11-07 PROCEDURE — 20610 DRAIN/INJ JOINT/BURSA W/O US: CPT | Mod: 50

## 2022-11-07 NOTE — DISCUSSION/SUMMARY
[Medication Risks Reviewed] : Medication risks reviewed [de-identified] : Status post Euflexxa injection 2 of 3 to bilateral knees.  Tolerated well.  We will follow-up in 1 week for repeat injection.

## 2022-11-07 NOTE — REASON FOR VISIT
[Follow-Up Visit] : a follow-up visit for [FreeTextEntry2] : B/l knee Euflexxa #2 lot # [id number]  exp 09/19/23

## 2022-11-07 NOTE — PROCEDURE
[de-identified] : Euflexxa # 2 Right knee\par Discussed at length with the patient the planned Euflexxa injection. The risks, benefits, convalescence and alternatives were reviewed. The possible side effects discussed included but were not limited to: pain, swelling, heat and redness. There symptoms are generally mild but if they are extensive then contact the office. Giving pain relievers by mouth such as NSAID´s or Tylenol can generally treat the reactions to Euflexxa. Rare cases of infection have been noted. Rash, hives and itching may occur post injection. If you have muscle pain or cramps, flushing and or swelling of the face, rapid heart beat, nausea, dizziness, fever, chills, headache, difficulty breathing, swelling in the arms or legs, or have a prickly feeling of your skin, contact a health care provider immediately.\par \par Following this discussion, the knee was prepped with betadine and under sterile condition the Euflexxa injection was performed with a 22 gauge needle. The needle was introduced into the joint, aspiration was performed to ensure intra-articular placement and the medication was injected. Upon withdrawal of the needle the site was cleaned with alcohol and a bandaid applied. The patient tolerated the injection well and there were no adverse effects. Post injection instructions included no strenuous activity for 24 hours, cryotherapy and if there are any adverse effects to contact the office.\par \par Euflexxa # 2 Left knee\par Discussed at length with the patient the planned Euflexxa injection. The risks, benefits, convalescence and alternatives were reviewed. The possible side effects discussed included but were not limited to: pain, swelling, heat and redness. There symptoms are generally mild but if they are extensive then contact the office. Giving pain relievers by mouth such as NSAID´s or Tylenol can generally treat the reactions to Euflexxa. Rare cases of infection have been noted. Rash, hives and itching may occur post injection. If you have muscle pain or cramps, flushing and or swelling of the face, rapid heart beat, nausea, dizziness, fever, chills, headache, difficulty breathing, swelling in the arms or legs, or have a prickly feeling of your skin, contact a health care provider immediately.\par \par Following this discussion, the knee was prepped with betadine and under sterile condition the Euflexxa injection was performed with a 22 gauge needle. The needle was introduced into the joint, aspiration was performed to ensure intra-articular placement and the medication was injected. Upon withdrawal of the needle the site was cleaned with alcohol and a bandaid applied. The patient tolerated the injection well and there were no adverse effects. Post injection instructions included no strenuous activity for 24 hours, cryotherapy and if there are any adverse effects to contact the office.\par

## 2022-11-09 NOTE — PROGRESS NOTE ADULT - PROBLEM SELECTOR PROBLEM 4
----- Message from Roderick Chapincito sent at 11/9/2022  3:38 PM EST -----  Subject: Referral Request    Reason for referral request? Patient is wanting to have lab orders put in   for her before her appointment on 12/20/2022. Please call her as soon as   the lab orders are in. Thank you. Provider patient wants to be referred to(if known):     Provider Phone Number(if known):     Additional Information for Provider?   ---------------------------------------------------------------------------  --------------  [id number]    [phone]; OK to leave message on voicemail  ---------------------------------------------------------------------------  --------------
Bronchitis
Bronchitis

## 2022-11-14 ENCOUNTER — APPOINTMENT (OUTPATIENT)
Dept: ORTHOPEDIC SURGERY | Facility: CLINIC | Age: 82
End: 2022-11-14

## 2022-11-22 ENCOUNTER — APPOINTMENT (OUTPATIENT)
Dept: ORTHOPEDIC SURGERY | Facility: CLINIC | Age: 82
End: 2022-11-22

## 2022-11-22 VITALS
SYSTOLIC BLOOD PRESSURE: 119 MMHG | WEIGHT: 226 LBS | HEIGHT: 68 IN | HEART RATE: 71 BPM | DIASTOLIC BLOOD PRESSURE: 73 MMHG | BODY MASS INDEX: 34.25 KG/M2

## 2022-11-22 PROCEDURE — 20610 DRAIN/INJ JOINT/BURSA W/O US: CPT | Mod: 50

## 2022-11-22 NOTE — PROCEDURE
[de-identified] : Euflexxa # 3/3 Right knee\par Discussed at length with the patient the planned Euflexxa injection. The risks, benefits, convalescence and alternatives were reviewed. The possible side effects discussed included but were not limited to: pain, swelling, heat and redness. There symptoms are generally mild but if they are extensive then contact the office. Giving pain relievers by mouth such as NSAID´s or Tylenol can generally treat the reactions to Euflexxa. Rare cases of infection have been noted. Rash, hives and itching may occur post injection. If you have muscle pain or cramps, flushing and or swelling of the face, rapid heart beat, nausea, dizziness, fever, chills, headache, difficulty breathing, swelling in the arms or legs, or have a prickly feeling of your skin, contact a health care provider immediately.\par \par Following this discussion, the knee was prepped with betadine and under sterile condition the Euflexxa injection was performed with a 22 gauge needle. The needle was introduced into the joint, aspiration was performed to ensure intra-articular placement and the medication was injected. Upon withdrawal of the needle the site was cleaned with alcohol and a bandaid applied. The patient tolerated the injection well and there were no adverse effects. Post injection instructions included no strenuous activity for 24 hours, cryotherapy and if there are any adverse effects to contact the office.\par \par Euflexxa # 3/3 Left knee\par Discussed at length with the patient the planned Euflexxa injection. The risks, benefits, convalescence and alternatives were reviewed. The possible side effects discussed included but were not limited to: pain, swelling, heat and redness. There symptoms are generally mild but if they are extensive then contact the office. Giving pain relievers by mouth such as NSAID´s or Tylenol can generally treat the reactions to Euflexxa. Rare cases of infection have been noted. Rash, hives and itching may occur post injection. If you have muscle pain or cramps, flushing and or swelling of the face, rapid heart beat, nausea, dizziness, fever, chills, headache, difficulty breathing, swelling in the arms or legs, or have a prickly feeling of your skin, contact a health care provider immediately.\par \par Following this discussion, the knee was prepped with betadine and under sterile condition the Euflexxa injection was performed with a 22 gauge needle. The needle was introduced into the joint, aspiration was performed to ensure intra-articular placement and the medication was injected. Upon withdrawal of the needle the site was cleaned with alcohol and a bandaid applied. The patient tolerated the injection well and there were no adverse effects. Post injection instructions included no strenuous activity for 24 hours, cryotherapy and if there are any adverse effects to contact the office.\par

## 2022-11-22 NOTE — DISCUSSION/SUMMARY
[Medication Risks Reviewed] : Medication risks reviewed [Surgical risks reviewed] : Surgical risks reviewed [de-identified] : 81 year old female status post Euflexxa injection 3 of 3 to bilateral knees. Patient tolerated procedure well. Will follow up in 8 weeks for repeat evaluation. All questions that were asked were answered. Patient verbalized understanding and is in agreement with plan.

## 2022-11-23 ENCOUNTER — OFFICE (OUTPATIENT)
Dept: URBAN - METROPOLITAN AREA CLINIC 104 | Facility: CLINIC | Age: 82
Setting detail: OPHTHALMOLOGY
End: 2022-11-23
Payer: MEDICARE

## 2022-11-23 DIAGNOSIS — Z96.1: ICD-10-CM

## 2022-11-23 PROCEDURE — 99024 POSTOP FOLLOW-UP VISIT: CPT | Performed by: OPTOMETRIST

## 2022-11-23 ASSESSMENT — LID EXAM ASSESSMENTS
OD_BLEPHARITIS: RLL RUL 2+
OS_BLEPHARITIS: LLL LUL 2+

## 2022-11-23 ASSESSMENT — VISUAL ACUITY
OD_BCVA: 20/25
OS_BCVA: 20/25

## 2022-11-23 ASSESSMENT — TONOMETRY
OD_IOP_MMHG: 16
OS_IOP_MMHG: 14

## 2022-11-23 ASSESSMENT — REFRACTION_AUTOREFRACTION
OS_CYLINDER: -0.75
OD_AXIS: 110
OD_SPHERE: +0.75
OS_AXIS: 90
OS_SPHERE: +0.75
OD_CYLINDER: -1.00

## 2022-11-23 ASSESSMENT — KERATOMETRY
OD_K1POWER_DIOPTERS: 41.98
OS_K1POWER_DIOPTERS: 42.24
OS_K2POWER_DIOPTERS: 42.72
OD_AXISANGLE_DEGREES: 34
OS_AXISANGLE_DEGREES: 142
OD_K2POWER_DIOPTERS: 42.40

## 2022-11-23 ASSESSMENT — CONFRONTATIONAL VISUAL FIELD TEST (CVF)
OD_FINDINGS: FULL
OS_FINDINGS: FULL

## 2022-11-23 ASSESSMENT — SPHEQUIV_DERIVED
OS_SPHEQUIV: 0.375
OD_SPHEQUIV: 0.25

## 2022-11-23 ASSESSMENT — AXIALLENGTH_DERIVED
OS_AL: 23.8227
OD_AL: 23.982

## 2022-11-23 ASSESSMENT — TEAR BREAK UP TIME (TBUT)
OD_TBUT: T
OS_TBUT: T

## 2023-01-13 ENCOUNTER — APPOINTMENT (OUTPATIENT)
Dept: PULMONOLOGY | Facility: CLINIC | Age: 83
End: 2023-01-13
Payer: MEDICARE

## 2023-01-13 VITALS
RESPIRATION RATE: 16 BRPM | HEART RATE: 72 BPM | DIASTOLIC BLOOD PRESSURE: 60 MMHG | WEIGHT: 234 LBS | SYSTOLIC BLOOD PRESSURE: 122 MMHG | BODY MASS INDEX: 39.95 KG/M2 | HEIGHT: 64 IN | OXYGEN SATURATION: 97 %

## 2023-01-13 PROCEDURE — 99204 OFFICE O/P NEW MOD 45 MIN: CPT

## 2023-01-13 NOTE — HISTORY OF PRESENT ILLNESS
[CPAP] : CPAP [Good Compliance] : good compliance with treatment [Good Tolerance] : good tolerance of treatment [Good Symptom Control] : good symptom control [Follow-Up - Routine Clinic] : a routine clinic follow-up of [Excess Weight] : excess weight [Dyspnea] : dyspnea [Low Calorie Diet] : low calorie diet [Fair Compliance] : fair compliance with treatment [Fair Tolerance] : fair tolerance of treatment [Poor Symptom Control] : poor symptom control [Dyslipidemia] : dyslipidemia [Sleep Apnea] : sleep apnea [Hypertension] : hypertension [High] : high [Low Calorie] : low calorie [Well Balanced Diet] : well balanced meals [None] : The patient does not exercise [Former] : former [TextBox_4] : She was previously hospitalized for bronchitis and developed AF and is followed with cardiology.\par Has h/o fibromyalgia and hypertension. \par She was previously found to be severely anemic when she was seen in [facility] ER for SOB and is now being followed by Dr. Mathews of hematology. W/u at that time in 2010 included a CTA which was negative for PE but revealed AUNG nodules. Subsequent Chest CT revealed resolution of nodules.\par She is also followed by Dr. Gant of  for PUD. \par She is without significant respiratory complaints other than SOBOE.\par S/p Covid infection 11/2022 with sore throat, cough, chills and was given Z-sahra with resolution of symptoms\par Former smoker of 3 ppd x 25 years, quit 1980.\par  [TextBox_13] : 25 [YearQuit] : 1980 [ESS] : 7 [TextBox_11] : 6 [FreeTextEntry1] : \par  [de-identified] : at 8 cm of water

## 2023-01-13 NOTE — PHYSICAL EXAM
[No Acute Distress] : no acute distress [Normal Appearance] : normal appearance [Supple] : supple [Normal Rate/Rhythm] : normal rate/rhythm [Normal S1, S2] : normal s1, s2 [No Murmurs] : no murmurs [No Resp Distress] : no resp distress [No Acc Muscle Use] : no acc muscle use [Normal Rhythm and Effort] : normal rhythm and effort [Clear to Auscultation Bilaterally] : clear to auscultation bilaterally [No Abnormalities] : no abnormalities [Benign] : benign [Not Tender] : not tender [Soft] : soft [3+ Pitting] : 3+ pitting [No Focal Deficits] : no focal deficits [Oriented x3] : oriented x3 [TextBox_2] : Morbidly obese

## 2023-01-13 NOTE — PROCEDURE
[FreeTextEntry1] : Previous PFTs revealed mild restriction and a reduced diffusion capacity which corrected for alveolar volume.\par PFTs performed previously with an essentially normal spirometry with normal lung volumes and a moderately reduced diffusion capacity which nearly corrected for alveolar volume. \par FS Hgb was 6.8 in the past

## 2023-01-13 NOTE — REVIEW OF SYSTEMS
[Postnasal Drip] : postnasal drip [Sinus Problems] : sinus problems [SOB on Exertion] : sob on exertion [Edema] : edema [GERD] : gerd [Back Pain] : back pain [Anemia] : anemia [Thyroid Problem] : thyroid problem [Negative] : Psychiatric [TextBox_122] : h/o KATHERIN 2017

## 2023-01-13 NOTE — REASON FOR VISIT
[Initial] : an initial visit [Sleep Apnea] : sleep apnea [Shortness of Breath] : shortness of breath [Obesity] : obesity [Family Member] : family member [TextBox_44] : Prior Covid infection

## 2023-01-13 NOTE — CONSULT LETTER
[Osmel Cavanaugh MD] : Osmel Cavanaugh MD [Dear  ___] : Dear  [unfilled], [Consult Letter:] : I had the pleasure of evaluating your patient, [unfilled]. [Please see my note below.] : Please see my note below. [Consult Closing:] : Thank you very much for allowing me to participate in the care of this patient.  If you have any questions, please do not hesitate to contact me. [Sincerely,] : Sincerely, [FreeTextEntry3] : Osmel Cavanaugh MD, FCCP, D. ABSM\par Pulmonary and Sleep Medicine\par [facility] at [place]

## 2023-01-13 NOTE — DISCUSSION/SUMMARY
[FreeTextEntry1] : \par    #1. The patient has TIM likely secondary to her fibromyalgia and for which she is maintained on nasal CPAP at 8 cm of water.  She is doing well with this treatment modality and is compliant. I will continue her nasal CPAP. \par    #2. She will use her albuterol as needed but rarely requires this medication.\par    #3. The patient's last chest CT revealed no nodules. \par    #4. I have recommended diet and exercise for weight loss. \par #5. She will f/u one month after starting CPAP therapy with compliance \par #6. F/u with Dr. Mathews for her h/o anemia\par #7. Pt had both Covid vaccines and s/p Covid infection\par #8. Reviewed risks of exposure and symptoms of Covid-19 virus, including how the virus is spread and precautions to avoid cisco virus. \par \par The patient expressed understanding and agreement with the above recommendations/plan and accepts responsibility to be compliant with recommended testing, therapies, and f/u visits.\par All relevant questions and concerns were addressed. \par Discussed above with patient and daughter who was also present. \par

## 2023-01-23 ENCOUNTER — APPOINTMENT (OUTPATIENT)
Dept: ORTHOPEDIC SURGERY | Facility: CLINIC | Age: 83
End: 2023-01-23

## 2023-02-17 ENCOUNTER — NON-APPOINTMENT (OUTPATIENT)
Age: 83
End: 2023-02-17

## 2023-04-07 ENCOUNTER — APPOINTMENT (OUTPATIENT)
Dept: PULMONOLOGY | Facility: CLINIC | Age: 83
End: 2023-04-07
Payer: MEDICARE

## 2023-04-07 VITALS
SYSTOLIC BLOOD PRESSURE: 124 MMHG | RESPIRATION RATE: 16 BRPM | OXYGEN SATURATION: 98 % | HEART RATE: 103 BPM | HEIGHT: 64 IN | DIASTOLIC BLOOD PRESSURE: 72 MMHG

## 2023-04-07 DIAGNOSIS — R06.02 SHORTNESS OF BREATH: ICD-10-CM

## 2023-04-07 DIAGNOSIS — Z87.891 PERSONAL HISTORY OF NICOTINE DEPENDENCE: ICD-10-CM

## 2023-04-07 DIAGNOSIS — E66.01 MORBID (SEVERE) OBESITY DUE TO EXCESS CALORIES: ICD-10-CM

## 2023-04-07 DIAGNOSIS — G47.33 OBSTRUCTIVE SLEEP APNEA (ADULT) (PEDIATRIC): ICD-10-CM

## 2023-04-07 DIAGNOSIS — J12.3 HUMAN METAPNEUMOVIRUS PNEUMONIA: ICD-10-CM

## 2023-04-07 PROCEDURE — 99215 OFFICE O/P EST HI 40 MIN: CPT

## 2023-04-07 NOTE — HISTORY OF PRESENT ILLNESS
[Former] : former [CPAP] : CPAP [Good Compliance] : good compliance with treatment [Good Tolerance] : good tolerance of treatment [Good Symptom Control] : good symptom control [Follow-Up - Routine Clinic] : a routine clinic follow-up of [Excess Weight] : excess weight [Dyspnea] : dyspnea [Low Calorie Diet] : low calorie diet [Fair Compliance] : fair compliance with treatment [Fair Tolerance] : fair tolerance of treatment [Poor Symptom Control] : poor symptom control [Dyslipidemia] : dyslipidemia [Sleep Apnea] : sleep apnea [Hypertension] : hypertension [High] : high [Low Calorie] : low calorie [Well Balanced Diet] : well balanced meals [None] : The patient does not exercise [TextBox_4] : She was previously hospitalized for bronchitis and developed AF and is followed with cardiology.\par Has h/o fibromyalgia and hypertension. \par She was previously found to be severely anemic when she was seen in [facility] ER for SOB and is now being followed by Dr. Mathews of hematology. W/u at that time in 2010 included a CTA which was negative for PE but revealed AUNG nodules. Subsequent Chest CT revealed resolution of nodules.\par She is also followed by Dr. Gant of  for PUD. \par She is without significant respiratory complaints other than SOBOE.\par S/p Covid infection 11/2022 with sore throat, cough, chills and was given Z-sahra with resolution of symptoms\par Former smoker of 3 ppd x 25 years, quit 1980.\par Pt hospitalized from 2/21-3/1/23 for viral pneumonia with human meta pneumovirus. Pt did well and d/c'd home. \par C/o weakness but otherwise near baseline.\par  [TextBox_13] : 25 [YearQuit] : 1980 [ESS] : 7 [TextBox_11] : 6 [FreeTextEntry1] : \par  [de-identified] : at 8 cm of water

## 2023-04-07 NOTE — REASON FOR VISIT
[Sleep Apnea] : sleep apnea [Shortness of Breath] : shortness of breath [Obesity] : obesity [Family Member] : family member [Follow-Up - From Hospitalization] : a follow-up visit after a recent hospitalization [TextBox_44] : Prior Covid infection

## 2023-04-07 NOTE — CONSULT LETTER
[Dear  ___] : Dear  [unfilled], [Consult Letter:] : I had the pleasure of evaluating your patient, [unfilled]. [Please see my note below.] : Please see my note below. [Consult Closing:] : Thank you very much for allowing me to participate in the care of this patient.  If you have any questions, please do not hesitate to contact me. [Sincerely,] : Sincerely, [FreeTextEntry3] : Osmel Cavanaugh MD, FCCP, D. ABSM\par Pulmonary and Sleep Medicine\par [facility] at [place]

## 2023-04-07 NOTE — DISCUSSION/SUMMARY
[FreeTextEntry1] : \par    #1. The patient has TIM likely secondary to her fibromyalgia and for which she is maintained on APAP 6-12 cm of water; The patient is using and benefitting from CPAP therapy \par    #2. She will use her albuterol as needed but rarely requires this medication.\par    #3. The patient's last chest CT revealed no nodules and with only mild bibasilar atelectasis per report\par    #4. I have recommended diet and exercise for weight loss. \par #5. Replace PAP equipment as needed\par #6. F/u with Dr. Mathews for her h/o anemia\par #7. Pt had both Covid vaccines and s/p Covid infection\par #8. W/u for thyroid nodules per PCP\par #9. F/u in 6-12 months as pt does not want to f/u sooner\par #10. Reviewed risks of exposure and symptoms of Covid-19 virus, including how the virus is spread and precautions to avoid cisco virus. \par \par The patient expressed understanding and agreement with the above recommendations/plan and accepts responsibility to be compliant with recommended testing, therapies, and f/u visits.\par All relevant questions and concerns were addressed. \par Discussed above with patient and daughter who was also present.  [employer] Pregnancy And Lactation Text: This medication is Pregnancy Category X and is absolutely contraindicated during pregnancy. It is unknown if it is excreted in breast milk.

## 2023-05-01 ENCOUNTER — APPOINTMENT (OUTPATIENT)
Dept: ORTHOPEDIC SURGERY | Facility: CLINIC | Age: 83
End: 2023-05-01
Payer: MEDICARE

## 2023-05-01 PROCEDURE — 20610 DRAIN/INJ JOINT/BURSA W/O US: CPT | Mod: 50

## 2023-05-01 NOTE — DISCUSSION/SUMMARY
[Medication Risks Reviewed] : Medication risks reviewed [de-identified] : 82 year old female status post Euflexxa injection 1 of 3 to bilateral knees. Tolerated well. We will follow-up in 1 week for repeat injection. All questions were addressed, patient verbalized understanding and is in agreement with plan.

## 2023-05-01 NOTE — PROCEDURE
[de-identified] : Euflexxa # 1 Right knee\par Discussed at length with the patient the planned Euflexxa injection. The risks, benefits, convalescence and alternatives were reviewed. The possible side effects discussed included but were not limited to: pain, swelling, heat and redness. There symptoms are generally mild but if they are extensive then contact the office. Giving pain relievers by mouth such as NSAID´s or Tylenol can generally treat the reactions to Euflexxa. Rare cases of infection have been noted. Rash, hives and itching may occur post injection. If you have muscle pain or cramps, flushing and or swelling of the face, rapid heart beat, nausea, dizziness, fever, chills, headache, difficulty breathing, swelling in the arms or legs, or have a prickly feeling of your skin, contact a health care provider immediately.\par \par Following this discussion, the knee was prepped with betadine and under sterile condition the Euflexxa injection was performed with a 22 gauge needle. The needle was introduced into the joint, aspiration was performed to ensure intra-articular placement and the medication was injected. Upon withdrawal of the needle the site was cleaned with alcohol and a bandaid applied. The patient tolerated the injection well and there were no adverse effects. Post injection instructions included no strenuous activity for 24 hours, cryotherapy and if there are any adverse effects to contact the office.\par \par Euflexxa # 1 Left knee\par Discussed at length with the patient the planned Euflexxa injection. The risks, benefits, convalescence and alternatives were reviewed. The possible side effects discussed included but were not limited to: pain, swelling, heat and redness. There symptoms are generally mild but if they are extensive then contact the office. Giving pain relievers by mouth such as NSAID´s or Tylenol can generally treat the reactions to Euflexxa. Rare cases of infection have been noted. Rash, hives and itching may occur post injection. If you have muscle pain or cramps, flushing and or swelling of the face, rapid heart beat, nausea, dizziness, fever, chills, headache, difficulty breathing, swelling in the arms or legs, or have a prickly feeling of your skin, contact a health care provider immediately.\par \par Following this discussion, the knee was prepped with betadine and under sterile condition the Euflexxa injection was performed with a 22 gauge needle. The needle was introduced into the joint, aspiration was performed to ensure intra-articular placement and the medication was injected. Upon withdrawal of the needle the site was cleaned with alcohol and a bandaid applied. The patient tolerated the injection well and there were no adverse effects. Post injection instructions included no strenuous activity for 24 hours, cryotherapy and if there are any adverse effects to contact the office.\par

## 2023-05-08 ENCOUNTER — APPOINTMENT (OUTPATIENT)
Dept: ORTHOPEDIC SURGERY | Facility: CLINIC | Age: 83
End: 2023-05-08
Payer: MEDICARE

## 2023-05-08 PROCEDURE — 20610 DRAIN/INJ JOINT/BURSA W/O US: CPT | Mod: 50

## 2023-05-08 NOTE — HISTORY OF PRESENT ILLNESS
[de-identified] : Euflexxa # 1 Right knee Discussed at length with the patient the planned Euflexxa injection. The risks, benefits, convalescence and alternatives were reviewed. The possible side effects discussed included but were not limited to: pain, swelling, heat and redness. There symptoms are generally mild but if they are extensive then contact the office. Giving pain relievers by mouth such as NSAID´s or Tylenol can generally treat the reactions to Euflexxa. Rare cases of infection have been noted. Rash, hives and itching may occur post injection. If you have muscle pain or cramps, flushing and or swelling of the face, rapid heart beat, nausea, dizziness, fever, chills, headache, difficulty breathing, swelling in the arms or legs, or have a prickly feeling of your skin, contact a health care provider immediately.  Following this discussion, the knee was prepped with betadine and under sterile condition the Euflexxa injection was performed with a 22 gauge needle. The needle was introduced into the joint, aspiration was performed to ensure intra-articular placement and the medication was injected. Upon withdrawal of the needle the site was cleaned with alcohol and a bandaid applied. The patient tolerated the injection well and there were no adverse effects. Post injection instructions included no strenuous activity for 24 hours, cryotherapy and if there are any adverse effects to contact the office.  Euflexxa # 1 Left knee Discussed at length with the patient the planned Euflexxa injection. The risks, benefits, convalescence and alternatives were reviewed. The possible side effects discussed included but were not limited to: pain, swelling, heat and redness. There symptoms are generally mild but if they are extensive then contact the office. Giving pain relievers by mouth such as NSAID´s or Tylenol can generally treat the reactions to Euflexxa. Rare cases of infection have been noted. Rash, hives and itching may occur post injection. If you have muscle pain or cramps, flushing and or swelling of the face, rapid heart beat, nausea, dizziness, fever, chills, headache, difficulty breathing, swelling in the arms or legs, or have a prickly feeling of your skin, contact a health care provider immediately.  Following this discussion, the knee was prepped with betadine and under sterile condition the Euflexxa injection was performed with a 22 gauge needle. The needle was introduced into the joint, aspiration was performed to ensure intra-articular placement and the medication was injected. Upon withdrawal of the needle the site was cleaned with alcohol and a bandaid applied. The patient tolerated the injection well and there were no adverse effects. Post injection instructions included no strenuous activity for 24 hours, cryotherapy and if there are any adverse effects to contact the office.

## 2023-05-08 NOTE — REASON FOR VISIT
[Follow-Up Visit] : a follow-up visit for [Other: ____] : [unfilled] [FreeTextEntry2] : Bilateral knee Euflexxa #2 Lot # [id number] exp 05/05/24

## 2023-05-08 NOTE — PROCEDURE
[de-identified] : Euflexxa # 2 Right knee\par Discussed at length with the patient the planned Euflexxa injection. The risks, benefits, convalescence and alternatives were reviewed. The possible side effects discussed included but were not limited to: pain, swelling, heat and redness. There symptoms are generally mild but if they are extensive then contact the office. Giving pain relievers by mouth such as NSAID´s or Tylenol can generally treat the reactions to Euflexxa. Rare cases of infection have been noted. Rash, hives and itching may occur post injection. If you have muscle pain or cramps, flushing and or swelling of the face, rapid heart beat, nausea, dizziness, fever, chills, headache, difficulty breathing, swelling in the arms or legs, or have a prickly feeling of your skin, contact a health care provider immediately.\par \par Following this discussion, the knee was prepped with betadine and under sterile condition the Euflexxa injection was performed with a 22 gauge needle. The needle was introduced into the joint, aspiration was performed to ensure intra-articular placement and the medication was injected. Upon withdrawal of the needle the site was cleaned with alcohol and a bandaid applied. The patient tolerated the injection well and there were no adverse effects. Post injection instructions included no strenuous activity for 24 hours, cryotherapy and if there are any adverse effects to contact the office.\par \par Euflexxa # 2 Left knee\par Discussed at length with the patient the planned Euflexxa injection. The risks, benefits, convalescence and alternatives were reviewed. The possible side effects discussed included but were not limited to: pain, swelling, heat and redness. There symptoms are generally mild but if they are extensive then contact the office. Giving pain relievers by mouth such as NSAID´s or Tylenol can generally treat the reactions to Euflexxa. Rare cases of infection have been noted. Rash, hives and itching may occur post injection. If you have muscle pain or cramps, flushing and or swelling of the face, rapid heart beat, nausea, dizziness, fever, chills, headache, difficulty breathing, swelling in the arms or legs, or have a prickly feeling of your skin, contact a health care provider immediately.\par \par Following this discussion, the knee was prepped with betadine and under sterile condition the Euflexxa injection was performed with a 22 gauge needle. The needle was introduced into the joint, aspiration was performed to ensure intra-articular placement and the medication was injected. Upon withdrawal of the needle the site was cleaned with alcohol and a bandaid applied. The patient tolerated the injection well and there were no adverse effects. Post injection instructions included no strenuous activity for 24 hours, cryotherapy and if there are any adverse effects to contact the office.

## 2023-05-08 NOTE — DISCUSSION/SUMMARY
[Medication Risks Reviewed] : Medication risks reviewed [de-identified] : 82 year old female status post Euflexxa injection 2 of 3 to bilateral knees. Tolerated well. We will follow-up in 1 week for repeat injection. All questions were addressed, patient verbalized understanding and is in agreement with plan. \par

## 2023-05-16 ENCOUNTER — APPOINTMENT (OUTPATIENT)
Dept: ORTHOPEDIC SURGERY | Facility: CLINIC | Age: 83
End: 2023-05-16
Payer: MEDICARE

## 2023-05-16 PROCEDURE — 20610 DRAIN/INJ JOINT/BURSA W/O US: CPT | Mod: 50

## 2023-05-16 NOTE — DISCUSSION/SUMMARY
[Medication Risks Reviewed] : Medication risks reviewed [de-identified] : 82-year-old female presents to office status post Euflexxa injection 3 out of 3 to bilateral knees.  Patient tolerated well.,  The patient should follow-up in 3 months for repeat evaluation.  Questions were addressed with the patient and her daughter.  They both verbalized understanding and agreement with the plan.

## 2023-05-16 NOTE — PROCEDURE
[de-identified] : Euflexxa # 3 Right knee\par Discussed at length with the patient the planned Euflexxa injection. The risks, benefits, convalescence and alternatives were reviewed. The possible side effects discussed included but were not limited to: pain, swelling, heat and redness. There symptoms are generally mild but if they are extensive then contact the office. Giving pain relievers by mouth such as NSAID´s or Tylenol can generally treat the reactions to Euflexxa. Rare cases of infection have been noted. Rash, hives and itching may occur post injection. If you have muscle pain or cramps, flushing and or swelling of the face, rapid heart beat, nausea, dizziness, fever, chills, headache, difficulty breathing, swelling in the arms or legs, or have a prickly feeling of your skin, contact a health care provider immediately.\par \par Following this discussion, the knee was prepped with betadine and under sterile condition the Euflexxa injection was performed with a 22 gauge needle. The needle was introduced into the joint, aspiration was performed to ensure intra-articular placement and the medication was injected. Upon withdrawal of the needle the site was cleaned with alcohol and a bandaid applied. The patient tolerated the injection well and there were no adverse effects. Post injection instructions included no strenuous activity for 24 hours, cryotherapy and if there are any adverse effects to contact the office.\par \par Euflexxa # 3 Left knee\par Discussed at length with the patient the planned Euflexxa injection. The risks, benefits, convalescence and alternatives were reviewed. The possible side effects discussed included but were not limited to: pain, swelling, heat and redness. There symptoms are generally mild but if they are extensive then contact the office. Giving pain relievers by mouth such as NSAID´s or Tylenol can generally treat the reactions to Euflexxa. Rare cases of infection have been noted. Rash, hives and itching may occur post injection. If you have muscle pain or cramps, flushing and or swelling of the face, rapid heart beat, nausea, dizziness, fever, chills, headache, difficulty breathing, swelling in the arms or legs, or have a prickly feeling of your skin, contact a health care provider immediately.\par \par Following this discussion, the knee was prepped with betadine and under sterile condition the Euflexxa injection was performed with a 22 gauge needle. The needle was introduced into the joint, aspiration was performed to ensure intra-articular placement and the medication was injected. Upon withdrawal of the needle the site was cleaned with alcohol and a bandaid applied. The patient tolerated the injection well and there were no adverse effects. Post injection instructions included no strenuous activity for 24 hours, cryotherapy and if there are any adverse effects to contact the office. \par

## 2023-07-17 ENCOUNTER — APPOINTMENT (OUTPATIENT)
Dept: CARDIOLOGY | Facility: CLINIC | Age: 83
End: 2023-07-17

## 2023-08-22 NOTE — H&P PST ADULT - OTHER CARE PROVIDERS
Dr Sr Composite Graft Text: The defect edges were debeveled with a #15 scalpel blade. Given the location of the defect, shape of the defect, the proximity to free margins and the fact the defect was full thickness a composite graft was deemed most appropriate.  The defect was outline and then transferred to the donor site.  A full thickness graft was then excised from the donor site. The graft was then placed in the primary defect, oriented appropriately and then sutured into place.  The secondary defect was then repaired using a primary closure.

## 2023-09-11 ENCOUNTER — APPOINTMENT (OUTPATIENT)
Dept: ORTHOPEDIC SURGERY | Facility: CLINIC | Age: 83
End: 2023-09-11
Payer: MEDICARE

## 2023-09-11 VITALS
SYSTOLIC BLOOD PRESSURE: 134 MMHG | BODY MASS INDEX: 33.82 KG/M2 | HEIGHT: 67.5 IN | WEIGHT: 218 LBS | DIASTOLIC BLOOD PRESSURE: 77 MMHG

## 2023-09-11 PROCEDURE — 73564 X-RAY EXAM KNEE 4 OR MORE: CPT | Mod: 50

## 2023-09-11 PROCEDURE — 99213 OFFICE O/P EST LOW 20 MIN: CPT

## 2023-09-11 RX ORDER — LEVOCETIRIZINE DIHYDROCHLORIDE 5 MG/1
5 TABLET ORAL
Refills: 0 | Status: ACTIVE | COMMUNITY

## 2023-09-11 RX ORDER — MONTELUKAST 10 MG/1
10 TABLET, FILM COATED ORAL
Refills: 0 | Status: ACTIVE | COMMUNITY

## 2023-09-11 RX ORDER — HYALURONATE SODIUM 20 MG/2 ML
20 SYRINGE (ML) INTRAARTICULAR
Qty: 2 | Refills: 0 | Status: DISCONTINUED | OUTPATIENT
Start: 2022-11-01 | End: 2023-09-11

## 2023-10-13 NOTE — PATIENT PROFILE ADULT. - FUNCTIONAL SCREEN CURRENT LEVEL: SWALLOWING (IF SCORE 2 OR MORE FOR ANY ITEM, CONSULT REHAB SERVICES), MLM)
Group Psychotherapy    Site: [facility]    Clinical status of patient: Outpatient    10/13/2023    Length of service:[id number]    Referred by: [facility]     Number of patients in attendance: 5    Target symptoms: Chronic Pain Management     Patient's response to intervention:  The patient's response to intervention is active listening.    Progress toward goals and other mental status changes:  The patient's progress toward goals is fair .    Plan: group psychotherapy    Topics Covered: Pt attended CBT for Chronic Pain as part of their participation in [facility]. Topics discussed today included a discussion on inflammation and chronic pain. We discussed added sugars and inflammation. We discussed homework for the weekend, we also discussed shame and vulnerability and the role this plays in mental health. We discussed anxiety, depression, other mood disorders and chronic pain. Will f/u in 3 days.        (0) swallows foods/liquids without difficulty

## 2023-10-18 ENCOUNTER — RX ONLY (RX ONLY)
Age: 83
End: 2023-10-18

## 2023-10-18 ENCOUNTER — OFFICE (OUTPATIENT)
Dept: URBAN - METROPOLITAN AREA CLINIC 104 | Facility: CLINIC | Age: 83
Setting detail: OPHTHALMOLOGY
End: 2023-10-18
Payer: MEDICARE

## 2023-10-18 DIAGNOSIS — H01.004: ICD-10-CM

## 2023-10-18 DIAGNOSIS — H26.493: ICD-10-CM

## 2023-10-18 DIAGNOSIS — H40.013: ICD-10-CM

## 2023-10-18 DIAGNOSIS — H01.002: ICD-10-CM

## 2023-10-18 DIAGNOSIS — H16.223: ICD-10-CM

## 2023-10-18 DIAGNOSIS — H01.005: ICD-10-CM

## 2023-10-18 DIAGNOSIS — E11.9: ICD-10-CM

## 2023-10-18 DIAGNOSIS — Z96.1: ICD-10-CM

## 2023-10-18 DIAGNOSIS — H01.001: ICD-10-CM

## 2023-10-18 PROCEDURE — 92014 COMPRE OPH EXAM EST PT 1/>: CPT | Performed by: OPTOMETRIST

## 2023-10-18 PROCEDURE — 92133 CPTRZD OPH DX IMG PST SGM ON: CPT | Performed by: OPTOMETRIST

## 2023-10-18 ASSESSMENT — REFRACTION_AUTOREFRACTION
OS_AXIS: 094
OD_CYLINDER: -1.00
OS_SPHERE: PLANO
OD_AXIS: 092
OS_CYLINDER: -0.25
OD_SPHERE: +0.25

## 2023-10-18 ASSESSMENT — KERATOMETRY
OS_K1POWER_DIOPTERS: 42.40
OD_K1POWER_DIOPTERS: 42.29
OD_AXISANGLE_DEGREES: 010
OS_K2POWER_DIOPTERS: 42.88
OS_AXISANGLE_DEGREES: 089
OD_K2POWER_DIOPTERS: 42.51

## 2023-10-18 ASSESSMENT — TONOMETRY
OS_IOP_MMHG: 16
OD_IOP_MMHG: 17

## 2023-10-18 ASSESSMENT — TEAR BREAK UP TIME (TBUT)
OD_TBUT: T
OS_TBUT: T

## 2023-10-18 ASSESSMENT — CONFRONTATIONAL VISUAL FIELD TEST (CVF)
OS_FINDINGS: FULL
OD_FINDINGS: FULL

## 2023-10-18 ASSESSMENT — AXIALLENGTH_DERIVED: OD_AL: 24.1042

## 2023-10-18 ASSESSMENT — VISUAL ACUITY
OS_BCVA: 20/20-2
OD_BCVA: 20/25-2

## 2023-10-18 ASSESSMENT — LID EXAM ASSESSMENTS
OD_BLEPHARITIS: RLL RUL 2+
OS_BLEPHARITIS: LLL LUL 2+

## 2023-10-18 ASSESSMENT — SPHEQUIV_DERIVED: OD_SPHEQUIV: -0.25

## 2023-10-30 ENCOUNTER — APPOINTMENT (OUTPATIENT)
Dept: ORTHOPEDIC SURGERY | Facility: CLINIC | Age: 83
End: 2023-10-30
Payer: MEDICARE

## 2023-10-30 PROCEDURE — 99213 OFFICE O/P EST LOW 20 MIN: CPT | Mod: 25

## 2023-10-30 PROCEDURE — 20610 DRAIN/INJ JOINT/BURSA W/O US: CPT | Mod: 50

## 2023-11-06 ENCOUNTER — APPOINTMENT (OUTPATIENT)
Dept: ORTHOPEDIC SURGERY | Facility: CLINIC | Age: 83
End: 2023-11-06
Payer: MEDICARE

## 2023-11-06 PROCEDURE — 20610 DRAIN/INJ JOINT/BURSA W/O US: CPT | Mod: 50

## 2023-11-13 ENCOUNTER — APPOINTMENT (OUTPATIENT)
Dept: ORTHOPEDIC SURGERY | Facility: CLINIC | Age: 83
End: 2023-11-13
Payer: MEDICARE

## 2023-11-13 PROCEDURE — 20610 DRAIN/INJ JOINT/BURSA W/O US: CPT | Mod: 50

## 2024-01-19 NOTE — ASU PATIENT PROFILE, ADULT - PAIN SCALE PREFERRED, PROFILE
Patient was seen by neurology. The are going to do a C3-T1 fusion 2/14/2024 at [facility]. She reports that the Tramadol is working well for her pain control currently and she needs a refill. She has stopped taking the Valium. She reports that she did  her Valium because she did not have any of her Tramadol. However she reports that she is not taking the Valium. She is requesting enough tramadol to make it to her surgery as its the only thing that helps with her pain.   
numerical 0-10

## 2024-05-06 ENCOUNTER — APPOINTMENT (OUTPATIENT)
Dept: ORTHOPEDIC SURGERY | Facility: CLINIC | Age: 84
End: 2024-05-06
Payer: MEDICARE

## 2024-05-06 VITALS
WEIGHT: 218 LBS | HEART RATE: 87 BPM | BODY MASS INDEX: 33.82 KG/M2 | SYSTOLIC BLOOD PRESSURE: 107 MMHG | HEIGHT: 67.5 IN | DIASTOLIC BLOOD PRESSURE: 66 MMHG

## 2024-05-06 DIAGNOSIS — M17.0 BILATERAL PRIMARY OSTEOARTHRITIS OF KNEE: ICD-10-CM

## 2024-05-06 PROCEDURE — 99213 OFFICE O/P EST LOW 20 MIN: CPT | Mod: 25

## 2024-05-06 PROCEDURE — G2211 COMPLEX E/M VISIT ADD ON: CPT

## 2024-05-06 PROCEDURE — 20610 DRAIN/INJ JOINT/BURSA W/O US: CPT | Mod: 50

## 2024-05-06 RX ORDER — AMIODARONE HYDROCHLORIDE 100 MG/1
100 TABLET ORAL
Qty: 45 | Refills: 3 | Status: DISCONTINUED | COMMUNITY
Start: 2018-10-26 | End: 2024-05-06

## 2024-05-06 NOTE — REASON FOR VISIT
Pt presents to the ER with complaints of pain in the right lower back that started yesterday. Pt describes pain as a spasm; denies injury. Pt states she has experienced this same pain in the past; sees a pain doctor. Pt also reporting pain with urination.     [Follow-Up Visit] : a follow-up visit for [Other: ____] : [unfilled] [FreeTextEntry2] : Euflexxa #1 Lot # [id number] exp 04/28/25

## 2024-05-06 NOTE — PHYSICAL EXAM
[de-identified] : GENERAL APPEARANCE: Well nourished and hydrated, pleasant, alert, and oriented x 3. Appears their stated age. \par  HEENT: Normocephalic, extraocular eye motion intact. Nasal septum midline. Oral cavity clear. External auditory canal clear. \par  RESPIRATORY: Breath sounds clear and audible in all lobes. No wheezing, No accessory muscle use.\par  CARDIOVASCULAR: No apparent abnormalities.  Bilateral lymphedema of the lower extremity no varicosities. Pedal pulses are palpable.\par  NEUROLOGIC: Sensation is normal, no muscle weakness in the upper or lower extremities.\par  DERMATOLOGIC: No apparent skin lesions, moist, warm, no rash.\par  SPINE: Cervical spine appears normal and moves freely; thoracic spine appears normal and moves freely; lumbosacral spine appears normal and moves freely, normal, nontender.\par  MUSCULOSKELETAL: Hands, wrists, and elbows are normal and move freely, shoulders are normal and move freely. \par  Psychiatric: Oriented to person, place, and time, insight and judgement were intact and the affect was normal. \par  Musculoskeletal:. Left knee exam shows mild effusion, ROM is 0-1 15 degrees, no instability, no pain with Cadne, medial lateral joint line tenderness. \par  Right knee exam shows mild effusion, ROM is 5-1 05 degrees, 5 no instability, no no pain with Caden, medial lateral joint line tenderness. \par  5/5 motor strength in bilateral lower extremities. Sensory: Intact in bilateral lower extremities. DTRs: Biceps, brachioradialis, triceps, patellar, ankle and plantar 2+ and symmetric bilaterally. Pulses: dorsalis pedis, posterior tibial, femoral, popliteal, and radial 2+ and symmetric bilaterally. \par

## 2024-05-06 NOTE — PROCEDURE
[de-identified] : Euflexxa # 1 Right knee Discussed at length with the patient the planned Euflexxa injection. The risks, benefits, convalescence and alternatives were reviewed. The possible side effects discussed included but were not limited to: pain, swelling, heat and redness. There symptoms are generally mild but if they are extensive then contact the office. Giving pain relievers by mouth such as NSAIDs or Tylenol can generally treat the reactions to Euflexxa. Rare cases of infection have been noted. Rash, hives and itching may occur post injection. If you have muscle pain or cramps, flushing and or swelling of the face, rapid heart beat, nausea, dizziness, fever, chills, headache, difficulty breathing, swelling in the arms or legs, or have a prickly feeling of your skin, contact a health care provider immediately.  Following this discussion, the knee was prepped with betadine and under sterile condition the Euflexxa injection was performed with a 22 gauge needle. The needle was introduced into the joint, aspiration was performed to ensure intra-articular placement and the medication was injected. Upon withdrawal of the needle the site was cleaned with alcohol and a bandaid applied. The patient tolerated the injection well and there were no adverse effects. Post injection instructions included no strenuous activity for 24 hours, cryotherapy and if there are any adverse effects to contact the office.  Euflexxa # 1 Left knee Discussed at length with the patient the planned Euflexxa injection. The risks, benefits, convalescence and alternatives were reviewed. The possible side effects discussed included but were not limited to: pain, swelling, heat and redness. There symptoms are generally mild but if they are extensive then contact the office. Giving pain relievers by mouth such as NSAIDs or Tylenol can generally treat the reactions to Euflexxa. Rare cases of infection have been noted. Rash, hives and itching may occur post injection. If you have muscle pain or cramps, flushing and or swelling of the face, rapid heart beat, nausea, dizziness, fever, chills, headache, difficulty breathing, swelling in the arms or legs, or have a prickly feeling of your skin, contact a health care provider immediately.  Following this discussion, the knee was prepped with betadine and under sterile condition the Euflexxa injection was performed with a 22 gauge needle. The needle was introduced into the joint, aspiration was performed to ensure intra-articular placement and the medication was injected. Upon withdrawal of the needle the site was cleaned with alcohol and a bandaid applied. The patient tolerated the injection well and there were no adverse effects. Post injection instructions included no strenuous activity for 24 hours, cryotherapy and if there are any adverse effects to contact the office.

## 2024-05-06 NOTE — DISCUSSION/SUMMARY
[Medication Risks Reviewed] : Medication risks reviewed [Surgical risks reviewed] : Surgical risks reviewed [de-identified] : Patient is an 83-year-old female with severe bilateral knee osteoarthritis presenting today for follow-up.  She is a good response to viscosupplementation in the past.  I gave her injection Euflexxa in the bilateral knees which she tolerated well.  Discussed low impact activity and exercise.  I will see her back in 1 week for repeat injection.  All questions were asked and answered

## 2024-05-06 NOTE — HISTORY OF PRESENT ILLNESS
[de-identified] : Patient is an 83-year-old female here today for follow-up of his severe bilateral knee osteoarthritis.  She states that the viscosupplementation she received 6 months ago was very helpful.  She is now having increasing pain in the knees.  States it hurts to go up and down stairs rising to seated position.  Denies any recent falls or trauma

## 2024-05-13 ENCOUNTER — APPOINTMENT (OUTPATIENT)
Dept: ORTHOPEDIC SURGERY | Facility: CLINIC | Age: 84
End: 2024-05-13
Payer: MEDICARE

## 2024-05-13 PROCEDURE — 20610 DRAIN/INJ JOINT/BURSA W/O US: CPT | Mod: 50

## 2024-05-13 NOTE — PROCEDURE
[Injection] : Injection [Bilateral] : of bilateral [Knee Joint] : knee joint [Osteoarthritis] : Osteoarthritis [Joint Pain] : Joint pain [Patient] : patient [Verbal Consent Obtained] : verbal consent was obtained prior to the procedure [Alcohol] : Alcohol [Ethyl Chloride Spray] : ethyl chloride spray was used as a topical anesthetic [Lateral] : lateral [22] : a 22-gauge [2 mL Euflexxa___(lot #)] : 2mL Euflexxa ~Ulot# [unfilled] [Bandage Applied] : a bandage [Tolerated Well] : The patient tolerated the procedure well [None] : none [No Strenuous Activity___day(s)] : avoid strenuous activity for [unfilled] day(s) [___ Month(s)] : in [unfilled] month(s) [de-identified] : Euflexxa # 2 Right knee Discussed at length with the patient the planned Euflexxa injection. The risks, benefits, convalescence and alternatives were reviewed. The possible side effects discussed included but were not limited to: pain, swelling, heat and redness. There symptoms are generally mild but if they are extensive then contact the office. Giving pain relievers by mouth such as NSAIDs or Tylenol can generally treat the reactions to Euflexxa. Rare cases of infection have been noted. Rash, hives and itching may occur post injection. If you have muscle pain or cramps, flushing and or swelling of the face, rapid heart beat, nausea, dizziness, fever, chills, headache, difficulty breathing, swelling in the arms or legs, or have a prickly feeling of your skin, contact a health care provider immediately.  Following this discussion, the knee was prepped with betadine and under sterile condition the Euflexxa injection was performed with a 22 gauge needle. The needle was introduced into the joint, aspiration was performed to ensure intra-articular placement and the medication was injected. Upon withdrawal of the needle the site was cleaned with alcohol and a bandaid applied. The patient tolerated the injection well and there were no adverse effects. Post injection instructions included no strenuous activity for 24 hours, cryotherapy and if there are any adverse effects to contact the office.  Euflexxa # 2 Left knee Discussed at length with the patient the planned Euflexxa injection. The risks, benefits, convalescence and alternatives were reviewed. The possible side effects discussed included but were not limited to: pain, swelling, heat and redness. There symptoms are generally mild but if they are extensive then contact the office. Giving pain relievers by mouth such as NSAIDs or Tylenol can generally treat the reactions to Euflexxa. Rare cases of infection have been noted. Rash, hives and itching may occur post injection. If you have muscle pain or cramps, flushing and or swelling of the face, rapid heart beat, nausea, dizziness, fever, chills, headache, difficulty breathing, swelling in the arms or legs, or have a prickly feeling of your skin, contact a health care provider immediately.  Following this discussion, the knee was prepped with betadine and under sterile condition the Euflexxa injection was performed with a 22 gauge needle. The needle was introduced into the joint, aspiration was performed to ensure intra-articular placement and the medication was injected. Upon withdrawal of the needle the site was cleaned with alcohol and a bandaid applied. The patient tolerated the injection well and there were no adverse effects. Post injection instructions included no strenuous activity for 24 hours, cryotherapy and if there are any adverse effects to contact the office.  [FreeTextEntry8] : EXP: 05/12/2024

## 2024-05-13 NOTE — REVIEW OF SYSTEMS
[Joint Pain] : joint pain [Joint Stiffness] : joint stiffness [Joint Swelling] : joint swelling [Negative] : Heme/Lymph [FreeTextEntry9] : bilateral knee pain

## 2024-05-13 NOTE — HISTORY OF PRESENT ILLNESS
[de-identified] : Patient is an 83-year-old female here today for follow-up of his severe bilateral knee osteoarthritis.  She states that the viscosupplementation she received 6 months ago was very helpful.  She presents today for Euflexxa injection #2 for both knees. [Pain Location] : pain [] : right & left knee [Worsening] : worsening [7] : an average pain level of 7/10 [10] : a maximum pain level of 10/10 [Standing] : standing [Constant] : ~He/She~ states the symptoms seem to be constant [Direct Pressure] : worsened by direct pressure [Hip Movement] : worsened by hip movement [Sitting] : worsened by sitting [Walking] : worsened by walking [Knee Flexion] : worsened with knee flexion [Knee Extension] : worsened with knee extension [Ice] : relieved by ice [Opioid Analgesics] : relieved by opioid analgesics [de-identified] : going up and down the stairs, rising from a seated position

## 2024-05-13 NOTE — ADDENDUM
[FreeTextEntry1] : This note was written by Lou Luis, acting as the  for Dr. Carter. This note accurately reflects the work and decisions made by Dr. Carter.

## 2024-05-13 NOTE — REASON FOR VISIT
[Follow-Up Visit] : a follow-up visit for [Other: ____] : [unfilled] [FreeTextEntry2] : Euflexxa #1 Lot # [id number] exp 04/28/25

## 2024-05-13 NOTE — DISCUSSION/SUMMARY
[Medication Risks Reviewed] : Medication risks reviewed [Surgical risks reviewed] : Surgical risks reviewed [Other: ____] : in [unfilled] [de-identified] : Patient is an 83-year-old female with severe bilateral knee osteoarthritis presenting today for follow-up.  She is a good response to viscosupplementation in the past.   We did proceed with Euflexxa injection #2 for both knees today.  She will follow-up in 1 week for the third and final injection for each knee.  All questions answered.

## 2024-05-20 ENCOUNTER — APPOINTMENT (OUTPATIENT)
Dept: ORTHOPEDIC SURGERY | Facility: CLINIC | Age: 84
End: 2024-05-20
Payer: MEDICARE

## 2024-05-20 VITALS
BODY MASS INDEX: 35 KG/M2 | HEIGHT: 67 IN | SYSTOLIC BLOOD PRESSURE: 113 MMHG | HEART RATE: 87 BPM | WEIGHT: 223 LBS | DIASTOLIC BLOOD PRESSURE: 76 MMHG

## 2024-05-20 DIAGNOSIS — M17.0 BILATERAL PRIMARY OSTEOARTHRITIS OF KNEE: ICD-10-CM

## 2024-05-20 PROCEDURE — 20610 DRAIN/INJ JOINT/BURSA W/O US: CPT | Mod: 50

## 2024-05-20 NOTE — DISCUSSION/SUMMARY
[Medication Risks Reviewed] : Medication risks reviewed [Surgical risks reviewed] : Surgical risks reviewed [de-identified] : 83-year-old female presents to the office status post Euflexxa injection 3 out of 3 to bilateral knees.  Tolerated well.  Will follow-up in 3 months for repeat evaluation.  All questions addressed with the patient and her daughter, they both verbalized understanding and agreement the plan.

## 2024-05-20 NOTE — HISTORY OF PRESENT ILLNESS
[de-identified] : 83-year-old female presents to the office for Euflexxa injection 3-3 to bilateral knees.

## 2024-05-20 NOTE — PROCEDURE
[de-identified] : Euflexxa # 3 right knee Discussed at length with the patient the planned Euflexxa injection. The risks, benefits, convalescence and alternatives were reviewed. The possible side effects discussed included but were not limited to: pain, swelling, heat and redness. There symptoms are generally mild but if they are extensive then contact the office. Giving pain relievers by mouth such as NSAIDs or Tylenol can generally treat the reactions to Euflexxa. Rare cases of infection have been noted. Rash, hives and itching may occur post injection. If you have muscle pain or cramps, flushing and or swelling of the face, rapid heart beat, nausea, dizziness, fever, chills, headache, difficulty breathing, swelling in the arms or legs, or have a prickly feeling of your skin, contact a health care provider immediately.  Following this discussion, the knee was prepped with betadine and under sterile condition the Euflexxa injection was performed with a 22 gauge needle. The needle was introduced into the joint, aspiration was performed to ensure intra-articular placement and the medication was injected. Upon withdrawal of the needle the site was cleaned with alcohol and a bandaid applied. The patient tolerated the injection well and there were no adverse effects. Post injection instructions included no strenuous activity for 24 hours, cryotherapy and if there are any adverse effects to contact the office.  Euflexxa # 3 left knee Discussed at length with the patient the planned Euflexxa injection. The risks, benefits, convalescence and alternatives were reviewed. The possible side effects discussed included but were not limited to: pain, swelling, heat and redness. There symptoms are generally mild but if they are extensive then contact the office. Giving pain relievers by mouth such as NSAIDs or Tylenol can generally treat the reactions to Euflexxa. Rare cases of infection have been noted. Rash, hives and itching may occur post injection. If you have muscle pain or cramps, flushing and or swelling of the face, rapid heart beat, nausea, dizziness, fever, chills, headache, difficulty breathing, swelling in the arms or legs, or have a prickly feeling of your skin, contact a health care provider immediately.  Following this discussion, the knee was prepped with betadine and under sterile condition the Euflexxa injection was performed with a 22 gauge needle. The needle was introduced into the joint, aspiration was performed to ensure intra-articular placement and the medication was injected. Upon withdrawal of the needle the site was cleaned with alcohol and a bandaid applied. The patient tolerated the injection well and there were no adverse effects. Post injection instructions included no strenuous activity for 24 hours, cryotherapy and if there are any adverse effects to contact the office.

## 2024-10-23 ENCOUNTER — OFFICE (OUTPATIENT)
Dept: URBAN - METROPOLITAN AREA CLINIC 104 | Facility: CLINIC | Age: 84
Setting detail: OPHTHALMOLOGY
End: 2024-10-23
Payer: MEDICARE

## 2024-10-23 DIAGNOSIS — H01.001: ICD-10-CM

## 2024-10-23 DIAGNOSIS — H40.013: ICD-10-CM

## 2024-10-23 DIAGNOSIS — H01.002: ICD-10-CM

## 2024-10-23 DIAGNOSIS — H01.004: ICD-10-CM

## 2024-10-23 PROBLEM — H43.393 VITREOUS FLOATERS; BOTH EYES: Status: ACTIVE | Noted: 2024-10-23

## 2024-10-23 PROCEDURE — 92014 COMPRE OPH EXAM EST PT 1/>: CPT | Performed by: OPTOMETRIST

## 2024-10-23 PROCEDURE — 92133 CPTRZD OPH DX IMG PST SGM ON: CPT | Performed by: OPTOMETRIST

## 2024-10-23 ASSESSMENT — REFRACTION_AUTOREFRACTION
OD_CYLINDER: -0.50
OS_AXIS: 035
OD_AXIS: 090
OS_CYLINDER: -0.50
OD_SPHERE: 0.00
OS_SPHERE: +0.25

## 2024-10-23 ASSESSMENT — KERATOMETRY
OS_K2POWER_DIOPTERS: 42.83
OS_K1POWER_DIOPTERS: 42.35
OD_K2POWER_DIOPTERS: 42.56
OS_AXISANGLE_DEGREES: 096
OD_AXISANGLE_DEGREES: 027
OD_K1POWER_DIOPTERS: 42.19

## 2024-10-23 ASSESSMENT — LID EXAM ASSESSMENTS
OS_BLEPHARITIS: LLL LUL 2+
OD_BLEPHARITIS: RLL RUL 2+

## 2024-10-23 ASSESSMENT — TEAR BREAK UP TIME (TBUT)
OS_TBUT: T
OD_TBUT: T

## 2024-10-23 ASSESSMENT — TONOMETRY
OS_IOP_MMHG: 14
OD_IOP_MMHG: 13

## 2024-10-23 ASSESSMENT — VISUAL ACUITY
OD_BCVA: 20/25
OS_BCVA: 20/20-2

## 2024-10-23 ASSESSMENT — CONFRONTATIONAL VISUAL FIELD TEST (CVF)
OS_FINDINGS: FULL
OD_FINDINGS: FULL

## 2024-11-11 ENCOUNTER — APPOINTMENT (OUTPATIENT)
Dept: ORTHOPEDIC SURGERY | Facility: CLINIC | Age: 84
End: 2024-11-11
Payer: MEDICARE

## 2024-11-11 PROCEDURE — 20610 DRAIN/INJ JOINT/BURSA W/O US: CPT | Mod: 50

## 2024-11-11 PROCEDURE — 99213 OFFICE O/P EST LOW 20 MIN: CPT | Mod: 25

## 2024-11-11 RX ORDER — HYALURONATE SODIUM 20 MG/2 ML
20 SYRINGE (ML) INTRAARTICULAR
Qty: 1 | Refills: 0 | Status: ACTIVE | COMMUNITY
Start: 2024-11-11

## 2024-11-18 ENCOUNTER — APPOINTMENT (OUTPATIENT)
Dept: ORTHOPEDIC SURGERY | Facility: CLINIC | Age: 84
End: 2024-11-18
Payer: MEDICARE

## 2024-11-18 PROCEDURE — 20610 DRAIN/INJ JOINT/BURSA W/O US: CPT | Mod: 50

## 2024-11-25 ENCOUNTER — APPOINTMENT (OUTPATIENT)
Dept: ORTHOPEDIC SURGERY | Facility: CLINIC | Age: 84
End: 2024-11-25
Payer: MEDICARE

## 2024-11-25 DIAGNOSIS — M17.0 BILATERAL PRIMARY OSTEOARTHRITIS OF KNEE: ICD-10-CM

## 2024-11-25 PROCEDURE — 20610 DRAIN/INJ JOINT/BURSA W/O US: CPT | Mod: 50

## 2025-03-18 NOTE — PROGRESS NOTE ADULT - PROVIDER SPECIALTY LIST ADULT
Family Medicine March 18, 2025        Suma Calabrese  [address]  [facility] [address]          Dear Suma Calabrese:    You were seen in the [facility] at [facility] on 3/17/2025.  We are unable to reach you by phone, so we are sending you this letter.     It is important that you call [facility] lab result nurse at [phone], as we have information to relay to you AND/OR we MAY have to make some changes in your treatment.    Best time to call back is between 9AM and 5:30PM, 7 days a week.      Sincerely,     [facility] Lab Result RN  [phone]

## 2025-03-18 NOTE — H&P ADULT. - FAMILY HISTORY
Attending Progress Note      Subjective  No new complaints    Objective    Current Meds  Current Facility-Administered Medications   Medication Dose Route Frequency Provider Last Rate Last Admin    cefepime (MAXIPIME) 1,000 mg in sodium chloride 0.9 % 100 mL IVPB  1,000 mg Intravenous 2 times per day Nohelia Claire MD   Completed at 03/18/25 0900    metroNIDAZOLE (FLAGYL) premix IVPB 500 mg  500 mg Intravenous 3 times per day Nohelia Claire  mL/hr at 03/18/25 1353 500 mg at 03/18/25 1353    dextrose 5 % / sodium chloride 0.45% infusion   Intravenous Continuous Ambrose Jordan MD 75 mL/hr at 03/18/25 1221 Rate Verify at 03/18/25 1221    sodium chloride (NORMAL SALINE) 0.9 % bolus 1,000 mL  1,000 mL Intravenous Once Aurelio Guy MD        [Held by provider] heparin (porcine) injection 5,000 Units  5,000 Units Subcutaneous 3 times per day Ambrose Jordan MD   5,000 Units at 03/16/25 0543    pantoprazole (PROTONIX) 40 MG/20ML (compounded) suspension 40 mg  40 mg Oral QAM AC Ambrose Jordan MD   40 mg at 03/18/25 0516    umeclidinium-vilanterol (ANORO ELLIPTA) 62.5-25 MCG/ACT inhaler 1 puff  1 puff Inhalation Daily Resp Ambrose Jordan MD   1 puff at 03/18/25 0841    dextrose 50 % injection 25 g  25 g Intravenous PRN Ambrose Jordan MD        dextrose 50 % injection 12.5 g  12.5 g Intravenous PRN Ambrose Jordan MD        glucagon (GLUCAGEN) injection 1 mg  1 mg Intramuscular PRN Ambrose Jordan MD        dextrose (GLUTOSE) 40 % gel 15 g  15 g Oral PRN Ambrose Jordan MD        dextrose (GLUTOSE) 40 % gel 30 g  30 g Oral PRN Ambrose Jordan MD        sodium chloride 0.9 % injection 10 mL  10 mL Intravenous PRN Ambrose Jordan MD        sodium chloride 0.9 % injection 2 mL  2 mL Intracatheter 2 times per day Ambrose Jordan MD   2 mL at 03/18/25 0829    Potassium Standard Replacement Protocol (Levels 3.5 and lower)   Does not apply See Admin Instructions Ambrose Jordan MD            I/O's    Intake/Output Summary (Last 24 hours) at 3/18/2025  1721  Last data filed at 3/18/2025 1221  Gross per 24 hour   Intake 1665.35 ml   Output --   Net 1665.35 ml       Last Recorded Vitals  Vitals with min/max:    Vital Last Value 24 Hour Range   Temperature 97.7 °F (36.5 °C) (03/18/25 1715) Temp  Min: 97 °F (36.1 °C)  Max: 98.6 °F (37 °C)   Pulse 76 (03/18/25 1715) Pulse  Min: 68  Max: 91   Respiratory 16 (03/18/25 1641) Resp  Min: 14  Max: 26   Non-Invasive  Blood Pressure 116/64 (03/18/25 1715) BP  Min: 97/56  Max: 149/58   Pulse Oximetry 95 % (03/18/25 1715) SpO2  Min: 92 %  Max: 100 %   Arterial   Blood Pressure   No data recorded        Body mass index is 31.41 kg/m².    Physical Exam   GENERAL:  In no apparent distress  CHEST:  Chest with clear breath sounds bilaterally.   CARDIAC:  Regular rate and rhythm.  S1 and S2  VASCULAR:  No Edema.  Peripheral pulses normal and equal in all extremities  ABDOMEN:  Soft, without detectable tenderness.  No sign of distention.  No   rebound or guarding, and no masses palpated.  MUSCULOSKELETAL:  Good range of motion of all major joints. no calf tenderness    PSYCH:  no depression or anxiety     Labs     Recent Results (from the past 48 hour(s))   Anaerobe/Aerobe, Bacterial Culture With Gram Stain    Collection Time: 03/17/25  3:02 PM    Specimen: Pancreas; Pancreatic Fluid   Result Value Ref Range    CULTURE WITH GRAM STAIN, ANAEROBE/AEROBE No Growth <24 hours     Gram Stain Present Polymorphonuclear cells.     Gram Stain No organisms seen.     Gram Stain Slide prepared by Cytospin.    Fluid Cell Count and Differential (NON SYNOVIAL/NON CSF) (performable only)    Collection Time: 03/17/25  3:28 PM    Specimen: Other; Body Fluid   Result Value Ref Range    Fluid Color Yellow     Fluid Clarity Turbid     Total Nucleated Cells >100,000 (H) 0 - 1,000 /mcL       Imaging    Endoscopic Ultrasound (Upper) w Fluoroscopy   Final Result      Endoscopic Ultrasound (Upper) w Fine Needle Aspiration   Final Result       Esophagogastroduodenoscopy (EGD)   Final Result      CT ABDOMEN PELVIS W CONTRAST - IV contrast only   Final Result         1.  There has been interval removal of  surgical drainage catheters from   the gallbladder fossa/upper abdomen since 2/24/2025. There are significant   poorly organized fluid collections along the pancreatic body, gastric   outlet/proximal duodenum and within the gallbladder fossa,   increased/worsened since 2/20/2025. Infectious/inflammatory process   including pancreatitis should be considered. Patient has history of prior   perforated duodenal ulcer, no obvious pneumoperitoneum identified on   today's exam.      2. There are similar atrophic changes of the pancreas. The peripancreatic   portion of the ill-defined upper abdominal fluid collections demonstrate   some rim enhancement. Overall aggregate measurements of approximately 12.0   cm transverse by 3.6 cm craniocaudal height by 3.2 cm craniocaudal height.   The mesenteric vein appears focally compressed as it passes through the   peripancreatic fluid collection, see series 3 images 47-73. Otherwise, the   mesenteric veins, splenic vein and portal veins appear grossly patent.      3.  The bladder is not well distended, limiting evaluation.   Infectious/inflammatory cystitis is not excluded.      4.  Stomach contains fluid. There are inflammatory changes of the gastric   outlet and proximal duodenum. No obvious evidence of bowel obstruction.       5.  The appendix is not well visualized. No inflammatory changes in the   right lower quadrant to suggest acute appendicitis.      6. Mostly left-sided diverticulosis. No obvious imaging evidence of   diverticulitis.      7. Loops of large and small bowel are seen against the anterior abdominal   wall, adhesions/tethered bowel not excluded.      8. Midline abdominal incisional scar is present with small amount of fluid   along the subcutaneous incision, see series 3 image 122.      See above  for additional findings/observations.      Electronically Signed by: EDWIN LEVIN DO    Signed on: 3/14/2025 6:29 PM    Workstation ID: [id number]      XR CHEST AP OR PA   Final Result         1. COPD.      2. Findings suggest interstitial lung disease, stable.      3. No new infiltrate is identified.      Electronically Signed by: TEETEE LEON M.D.    Signed on: 3/14/2025 2:04 PM    Workstation ID: [id number]      IR ASPIRATION ABSCESS HEMATOMA CYST    (Results Pending)   FL INTRAOPERATIVE C ARM WITH REPORT    (Results Pending)          Assessment/Plan  Principal Problem:    Pancreatitis, unspecified pancreatitis type (CMD)  Abdominal pain  Fluid collections around pancreas      Continue meds    Note to patient: The 21st Century Cures Act makes medical notes like these available to patients in the interest of transparency. However, be advised this is a medical document. It is intended as peer to peer communication. It is written in medical language and may contain abbreviations or verbiage that are unfamiliar. It may appear blunt or direct. Medical documents are intended to carry relevant information, facts as evident, and the clinical opinion of the practitioner  Code Status  Code Status Information       Code Status    Full Resuscitation             PCP  Miriam Perry DO Arun A Kumar, MD, MD  3/18/2025 5:21 PM         <<-----Click on this checkbox to enter Family History Family history of cancer in mother     Family history of heart disease, father 57 y/o     Mother  Still living? Unknown  Family history of cancer, Age at diagnosis: Age Unknown

## 2025-05-05 ENCOUNTER — APPOINTMENT (OUTPATIENT)
Dept: ORTHOPEDIC SURGERY | Facility: CLINIC | Age: 85
End: 2025-05-05
Payer: MEDICARE

## 2025-05-05 DIAGNOSIS — M17.0 BILATERAL PRIMARY OSTEOARTHRITIS OF KNEE: ICD-10-CM

## 2025-05-05 PROCEDURE — 20610 DRAIN/INJ JOINT/BURSA W/O US: CPT | Mod: 50

## 2025-05-05 PROCEDURE — 99214 OFFICE O/P EST MOD 30 MIN: CPT | Mod: 25

## 2025-05-12 ENCOUNTER — APPOINTMENT (OUTPATIENT)
Dept: ORTHOPEDIC SURGERY | Facility: CLINIC | Age: 85
End: 2025-05-12
Payer: MEDICARE

## 2025-05-12 DIAGNOSIS — M17.0 BILATERAL PRIMARY OSTEOARTHRITIS OF KNEE: ICD-10-CM

## 2025-05-12 PROCEDURE — 20610 DRAIN/INJ JOINT/BURSA W/O US: CPT | Mod: 50

## 2025-05-19 ENCOUNTER — APPOINTMENT (OUTPATIENT)
Dept: ORTHOPEDIC SURGERY | Facility: CLINIC | Age: 85
End: 2025-05-19
Payer: MEDICARE

## 2025-05-19 DIAGNOSIS — M17.0 BILATERAL PRIMARY OSTEOARTHRITIS OF KNEE: ICD-10-CM

## 2025-05-19 PROCEDURE — 20610 DRAIN/INJ JOINT/BURSA W/O US: CPT | Mod: 50

## 2025-07-09 NOTE — ASU PATIENT PROFILE, ADULT - CAREGIVER ADDRESS
Detail Level: Detailed
Send Procedure Quote As Charge: No
Procedure Quote $ (Will Render In Note. Use Numbers Only, No Text Please.): 400
same